# Patient Record
Sex: MALE | Race: WHITE | Employment: UNEMPLOYED | URBAN - METROPOLITAN AREA
[De-identification: names, ages, dates, MRNs, and addresses within clinical notes are randomized per-mention and may not be internally consistent; named-entity substitution may affect disease eponyms.]

---

## 2024-06-21 ENCOUNTER — APPOINTMENT (OUTPATIENT)
Dept: CT IMAGING | Age: 34
End: 2024-06-21

## 2024-06-21 ENCOUNTER — HOSPITAL ENCOUNTER (EMERGENCY)
Age: 34
Discharge: HOME | End: 2024-06-21
Attending: EMERGENCY MEDICINE

## 2024-06-21 ENCOUNTER — APPOINTMENT (OUTPATIENT)
Dept: GENERAL RADIOLOGY | Age: 34
End: 2024-06-21

## 2024-06-21 VITALS
HEART RATE: 99 BPM | WEIGHT: 167.77 LBS | DIASTOLIC BLOOD PRESSURE: 91 MMHG | TEMPERATURE: 97.7 F | RESPIRATION RATE: 18 BRPM | OXYGEN SATURATION: 99 % | SYSTOLIC BLOOD PRESSURE: 141 MMHG

## 2024-06-21 DIAGNOSIS — L03.311 CELLULITIS OF ABDOMINAL WALL: ICD-10-CM

## 2024-06-21 DIAGNOSIS — K59.03 DRUG-INDUCED CONSTIPATION: Primary | ICD-10-CM

## 2024-06-21 DIAGNOSIS — L03.113 CELLULITIS OF RIGHT UPPER EXTREMITY: ICD-10-CM

## 2024-06-21 LAB
ALBUMIN SERPL-MCNC: 4 G/DL (ref 3.5–5.2)
ALBUMIN/GLOB SERPL: 1 {RATIO} (ref 1–2.5)
ALP SERPL-CCNC: 97 U/L (ref 40–129)
ALT SERPL-CCNC: 11 U/L (ref 10–50)
ANION GAP SERPL CALCULATED.3IONS-SCNC: 10 MMOL/L (ref 9–16)
AST SERPL-CCNC: 23 U/L (ref 10–50)
BASOPHILS # BLD: 0.03 K/UL (ref 0–0.2)
BASOPHILS NFR BLD: 0 % (ref 0–2)
BILIRUB SERPL-MCNC: 0.8 MG/DL (ref 0–1.2)
BUN SERPL-MCNC: 12 MG/DL (ref 6–20)
CALCIUM SERPL-MCNC: 8.6 MG/DL (ref 8.6–10.4)
CHLORIDE SERPL-SCNC: 100 MMOL/L (ref 98–107)
CO2 SERPL-SCNC: 27 MMOL/L (ref 20–31)
CREAT SERPL-MCNC: 0.8 MG/DL (ref 0.7–1.2)
EOSINOPHIL # BLD: 0.05 K/UL (ref 0–0.44)
EOSINOPHILS RELATIVE PERCENT: 1 % (ref 1–4)
ERYTHROCYTE [DISTWIDTH] IN BLOOD BY AUTOMATED COUNT: 12.4 % (ref 11.8–14.4)
GFR, ESTIMATED: >90 ML/MIN/1.73M2
GLUCOSE SERPL-MCNC: 106 MG/DL (ref 74–99)
HCT VFR BLD AUTO: 39 % (ref 40.7–50.3)
HGB BLD-MCNC: 12.6 G/DL (ref 13–17)
IMM GRANULOCYTES # BLD AUTO: 0.03 K/UL (ref 0–0.3)
IMM GRANULOCYTES NFR BLD: 0 %
LYMPHOCYTES NFR BLD: 1.53 K/UL (ref 1.1–3.7)
LYMPHOCYTES RELATIVE PERCENT: 17 % (ref 24–43)
MCH RBC QN AUTO: 31 PG (ref 25.2–33.5)
MCHC RBC AUTO-ENTMCNC: 32.3 G/DL (ref 28.4–34.8)
MCV RBC AUTO: 96.1 FL (ref 82.6–102.9)
MONOCYTES NFR BLD: 0.86 K/UL (ref 0.1–1.2)
MONOCYTES NFR BLD: 10 % (ref 3–12)
NEUTROPHILS NFR BLD: 72 % (ref 36–65)
NEUTS SEG NFR BLD: 6.41 K/UL (ref 1.5–8.1)
NRBC BLD-RTO: 0 PER 100 WBC
PLATELET # BLD AUTO: 184 K/UL (ref 138–453)
PMV BLD AUTO: 10.5 FL (ref 8.1–13.5)
POTASSIUM SERPL-SCNC: 3.9 MMOL/L (ref 3.7–5.3)
PROT SERPL-MCNC: 7.4 G/DL (ref 6.6–8.7)
RBC # BLD AUTO: 4.06 M/UL (ref 4.21–5.77)
SODIUM SERPL-SCNC: 137 MMOL/L (ref 136–145)
WBC OTHER # BLD: 8.9 K/UL (ref 3.5–11.3)

## 2024-06-21 PROCEDURE — 6370000000 HC RX 637 (ALT 250 FOR IP): Performed by: STUDENT IN AN ORGANIZED HEALTH CARE EDUCATION/TRAINING PROGRAM

## 2024-06-21 PROCEDURE — 74177 CT ABD & PELVIS W/CONTRAST: CPT

## 2024-06-21 PROCEDURE — 74022 RADEX COMPL AQT ABD SERIES: CPT

## 2024-06-21 PROCEDURE — 96374 THER/PROPH/DIAG INJ IV PUSH: CPT

## 2024-06-21 PROCEDURE — 80053 COMPREHEN METABOLIC PANEL: CPT

## 2024-06-21 PROCEDURE — 99285 EMERGENCY DEPT VISIT HI MDM: CPT

## 2024-06-21 PROCEDURE — 6360000004 HC RX CONTRAST MEDICATION: Performed by: STUDENT IN AN ORGANIZED HEALTH CARE EDUCATION/TRAINING PROGRAM

## 2024-06-21 PROCEDURE — 85025 COMPLETE CBC W/AUTO DIFF WBC: CPT

## 2024-06-21 PROCEDURE — 6360000002 HC RX W HCPCS: Performed by: STUDENT IN AN ORGANIZED HEALTH CARE EDUCATION/TRAINING PROGRAM

## 2024-06-21 RX ORDER — DOXYCYCLINE HYCLATE 100 MG
100 TABLET ORAL ONCE
Status: COMPLETED | OUTPATIENT
Start: 2024-06-21 | End: 2024-06-21

## 2024-06-21 RX ORDER — ACETAMINOPHEN 500 MG
1000 TABLET ORAL ONCE
Status: COMPLETED | OUTPATIENT
Start: 2024-06-21 | End: 2024-06-21

## 2024-06-21 RX ORDER — KETOROLAC TROMETHAMINE 15 MG/ML
15 INJECTION, SOLUTION INTRAMUSCULAR; INTRAVENOUS ONCE
Status: COMPLETED | OUTPATIENT
Start: 2024-06-21 | End: 2024-06-21

## 2024-06-21 RX ORDER — DOCUSATE SODIUM 100 MG/1
100 CAPSULE, LIQUID FILLED ORAL 2 TIMES DAILY
Qty: 60 CAPSULE | Refills: 0 | Status: SHIPPED | OUTPATIENT
Start: 2024-06-21 | End: 2024-07-21

## 2024-06-21 RX ORDER — NICOTINE 21 MG/24HR
1 PATCH, TRANSDERMAL 24 HOURS TRANSDERMAL ONCE
Status: DISCONTINUED | OUTPATIENT
Start: 2024-06-21 | End: 2024-06-22 | Stop reason: HOSPADM

## 2024-06-21 RX ORDER — POLYETHYLENE GLYCOL 3350 17 G/17G
17 POWDER, FOR SOLUTION ORAL DAILY PRN
Qty: 527 G | Refills: 1 | Status: SHIPPED | OUTPATIENT
Start: 2024-06-21 | End: 2024-07-21

## 2024-06-21 RX ORDER — DOXYCYCLINE HYCLATE 100 MG
100 TABLET ORAL 2 TIMES DAILY
Qty: 14 TABLET | Refills: 0 | Status: SHIPPED | OUTPATIENT
Start: 2024-06-21 | End: 2024-06-28

## 2024-06-21 RX ADMIN — DOXYCYCLINE HYCLATE 100 MG: 100 TABLET, COATED ORAL at 18:11

## 2024-06-21 RX ADMIN — ACETAMINOPHEN 1000 MG: 500 TABLET ORAL at 21:53

## 2024-06-21 RX ADMIN — KETOROLAC TROMETHAMINE 15 MG: 15 INJECTION, SOLUTION INTRAMUSCULAR; INTRAVENOUS at 21:54

## 2024-06-21 RX ADMIN — GLYCERIN 2 G: 2 SUPPOSITORY RECTAL at 18:48

## 2024-06-21 RX ADMIN — MAGNESIUM HYDROXIDE 30 ML: 400 SUSPENSION ORAL at 18:11

## 2024-06-21 RX ADMIN — IOPAMIDOL 75 ML: 755 INJECTION, SOLUTION INTRAVENOUS at 21:40

## 2024-06-21 ASSESSMENT — ENCOUNTER SYMPTOMS
SHORTNESS OF BREATH: 0
CONSTIPATION: 1
ABDOMINAL PAIN: 1

## 2024-06-21 ASSESSMENT — PAIN DESCRIPTION - LOCATION: LOCATION: ABDOMEN

## 2024-06-21 ASSESSMENT — PAIN - FUNCTIONAL ASSESSMENT: PAIN_FUNCTIONAL_ASSESSMENT: 0-10

## 2024-06-21 ASSESSMENT — PAIN SCALES - GENERAL: PAINLEVEL_OUTOF10: 7

## 2024-06-21 NOTE — ED NOTES
Pt came into the ed via triage due to having constipation   Pt stated he is also worried about a scab and wound on his stomach   Pt stated he is a drug user and has not done IV drugs for a couple days   Pt is Aox4 and ambulatory   RR are equal and regular   Pt has no other C/O at this time

## 2024-06-21 NOTE — ED PROVIDER NOTES
Exam  Constitutional:       General: He is not in acute distress.     Appearance: He is not ill-appearing.   HENT:      Mouth/Throat:      Mouth: Mucous membranes are moist.   Eyes:      Extraocular Movements: Extraocular movements intact.      Pupils: Pupils are equal, round, and reactive to light.   Pulmonary:      Effort: Pulmonary effort is normal.   Abdominal:      Palpations: Abdomen is soft.      Tenderness: There is generalized abdominal tenderness. There is no guarding.   Skin:     Comments: Patient with scabbed lesion on the lower abdomen with surrounding erythema.  Slight tenderness associated.  No induration or fluctuance at this site.  Similar lesion on the volar surface of the right forearm without sign of abscess.   Neurological:      Mental Status: He is alert.       DDX/DIAGNOSTIC RESULTS / EMERGENCY DEPARTMENT COURSE / MDM     Medical Decision Making  33-year-old male presents emergency department for evaluation of abdominal cramping and constipation since having quit narcotic use and starting Suboxone.  Some small amount of bright red blood when straining but otherwise not large-volume.  Discussed performing digital rectal exam to assess for stool ball/stool impaction.  Patient would prefer to try medications prior to digital rectal exam.  Will give milk of magnesia as well as glycerin suppository to assist in bowel movement.  Patient will likely need to be started on bowel regimen such as MiraLAX, Colace etc.  Patient also has 2 areas of cellulitis with scab lesions 1 on abdomen and 1 on the right forearm.  Will start on doxycycline given history of IV drug use.  Did discuss with patient staying out of the sun while on this medication as it can lead to severe burns.  Patient understanding and states that he will be able to do this.    Amount and/or Complexity of Data Reviewed  Labs: ordered.  Radiology: ordered.    Risk  OTC drugs.  Prescription drug management.        EKG  None    All EKG's are  MAGNESIA) 400 MG/5ML suspension Take 30 mLs by mouth daily as needed for Constipation, Disp-118 mL, R-0Print      polyethylene glycol (MIRALAX) 17 g packet Take 1 packet by mouth daily as needed for Constipation, Disp-527 g, R-1Print             Home Dasilva MD  Emergency Medicine Resident    (Please note that portions of thisnote were completed with a voice recognition program.  Efforts were made to edit the dictations but occasionally words are mis-transcribed.)

## 2024-06-21 NOTE — DISCHARGE INSTRUCTIONS
Please take the antibiotic as prescribed until the course is complete.  Complete the entire course of the medication to prevent reinfection/resistant infection.  The medication doxycycline can cause severe burns if you are exposed to sunlight for long durations.  Please avoid staying outside while you are on this medication especially given your light skin tone.    Your CT scan did not show any evidence of stool in the stomach.  It did show heavy stool burden however.  Please take the medication you were given medication to help assist in bowel movement.  It is recommended that you start taking MiraLAX and or Metamucil to help with your constipation.  You can also take the Colace daily to help with your constipation symptoms as well.    PLEASE RETURN TO THE EMERGENCY DEPARTMENT IMMEDIATELY for worsening symptoms, white drainage from the wound, redness or streaking, or if you develop any concerning symptoms such as: high fever not relieved by acetaminophen (Tylenol) and/or ibuprofen (Motrin / Advil), chills, shortness of breath, chest pain, feeling of your heart fluttering or racing, persistent nausea and/or vomiting, vomiting up blood, blood in your stool, loss of consciousness, numbness, weakness or tingling in the arms or legs or change in color of the extremities, changes in mental status, persistent headache, blurry vision, loss of bladder / bowel control, unable to follow up with your physician, or other any other care or concern.

## 2024-06-22 NOTE — ED PROVIDER NOTES
[facility]     Emergency Department     Faculty Attestation  8:04 PM EDT      I performed a history and physical examination of the patient and discussed management with the resident. I have reviewed and agree with the resident’s findings including all diagnostic interpretations, and treatment plans as written. Any areas of disagreement are noted on the chart. I was personally present for the key portions of any procedures. I have documented in the chart those procedures where I was not present during the key portions. I have reviewed the emergency nurses triage note. I agree with the chief complaint, past medical history, past surgical history, allergies, medications, social and family history as documented unless otherwise noted below. Documentation of the HPI, Physical Exam and Medical Decision Making performed by scriblouisa is based on my personal performance of the HPI, PE and MDM. For Physician Assistant/ Nurse Practitioner cases/documentation I have personally evaluated this patient and have completed at least one if not all key elements of the E/M (history, physical exam, and MDM). Additional findings are as noted.    Patient with constipation for the past 7 days unable to have a bowel movement.  Is unable to pass stool or gas.  Did tolerate oral intake while in the emergency room.  But does also having abdominal pain.    Patient with a diffusely tender abdomen.  Rectal exam was performed by resident with me present in the room.  There is a stool ball palpated but patient did not tolerate rectal exam very well.  Will plan on abdominal series, to rule out any mechanical reasons and plan on enema.      Maryanne Stone D.O, M.P.H  Attending Emergency Medicine Physician         Maryanne Stone,   06/21/24 2005

## 2024-08-06 ENCOUNTER — HOSPITAL ENCOUNTER (EMERGENCY)
Age: 34
Discharge: ANOTHER HOSPITAL | DRG: 751 | End: 2024-08-06
Attending: EMERGENCY MEDICINE
Payer: MEDICAID

## 2024-08-06 ENCOUNTER — HOSPITAL ENCOUNTER (INPATIENT)
Age: 34
LOS: 6 days | Discharge: HOME | DRG: 751 | End: 2024-08-12
Attending: PSYCHIATRY & NEUROLOGY | Admitting: PSYCHIATRY & NEUROLOGY
Payer: MEDICAID

## 2024-08-06 VITALS
RESPIRATION RATE: 16 BRPM | HEART RATE: 61 BPM | BODY MASS INDEX: 24.25 KG/M2 | DIASTOLIC BLOOD PRESSURE: 68 MMHG | HEIGHT: 68 IN | SYSTOLIC BLOOD PRESSURE: 103 MMHG | WEIGHT: 160 LBS | OXYGEN SATURATION: 99 % | TEMPERATURE: 99 F

## 2024-08-06 DIAGNOSIS — R45.851 SUICIDAL IDEATION: Primary | ICD-10-CM

## 2024-08-06 PROBLEM — F32.A DEPRESSION WITH SUICIDAL IDEATION: Status: ACTIVE | Noted: 2024-08-06

## 2024-08-06 PROBLEM — F33.3 MAJOR DEPRESSIVE DISORDER, RECURRENT, SEVERE WITH PSYCHOTIC FEATURES (HCC): Status: ACTIVE | Noted: 2024-08-06

## 2024-08-06 LAB
ALBUMIN SERPL-MCNC: 4.3 G/DL (ref 3.5–5.2)
ALBUMIN/GLOB SERPL: 1 {RATIO} (ref 1–2.5)
ALP SERPL-CCNC: 113 U/L (ref 40–129)
ALT SERPL-CCNC: 14 U/L (ref 10–50)
AMPHET UR QL SCN: POSITIVE
ANION GAP SERPL CALCULATED.3IONS-SCNC: 11 MMOL/L (ref 9–16)
APAP SERPL-MCNC: <5 UG/ML (ref 10–30)
AST SERPL-CCNC: 22 U/L (ref 10–50)
BARBITURATES UR QL SCN: NEGATIVE
BASOPHILS # BLD: 0.04 K/UL (ref 0–0.2)
BASOPHILS NFR BLD: 1 % (ref 0–2)
BENZODIAZ UR QL: NEGATIVE
BILIRUB SERPL-MCNC: 0.8 MG/DL (ref 0–1.2)
BUN SERPL-MCNC: 20 MG/DL (ref 6–20)
CALCIUM SERPL-MCNC: 8.8 MG/DL (ref 8.6–10.4)
CANNABINOIDS UR QL SCN: POSITIVE
CHLORIDE SERPL-SCNC: 102 MMOL/L (ref 98–107)
CO2 SERPL-SCNC: 23 MMOL/L (ref 20–31)
COCAINE UR QL SCN: NEGATIVE
CREAT SERPL-MCNC: 0.9 MG/DL (ref 0.7–1.2)
EOSINOPHIL # BLD: 0.05 K/UL (ref 0–0.44)
EOSINOPHILS RELATIVE PERCENT: 1 % (ref 1–4)
ERYTHROCYTE [DISTWIDTH] IN BLOOD BY AUTOMATED COUNT: 13.4 % (ref 11.8–14.4)
ETHANOL PERCENT: <0.01 %
ETHANOLAMINE SERPL-MCNC: <10 MG/DL (ref 0–0.08)
FENTANYL UR QL: NEGATIVE
GFR, ESTIMATED: >90 ML/MIN/1.73M2
GLUCOSE SERPL-MCNC: 112 MG/DL (ref 74–99)
HCT VFR BLD AUTO: 57.1 % (ref 40.7–50.3)
HGB BLD-MCNC: 18.9 G/DL (ref 13–17)
IMM GRANULOCYTES # BLD AUTO: <0.03 K/UL (ref 0–0.3)
IMM GRANULOCYTES NFR BLD: 0 %
INR PPP: 1.1
LACTIC ACID, WHOLE BLOOD: 1.3 MMOL/L (ref 0.7–2.1)
LIPASE SERPL-CCNC: 14 U/L (ref 13–60)
LYMPHOCYTES NFR BLD: 1.34 K/UL (ref 1.1–3.7)
LYMPHOCYTES RELATIVE PERCENT: 25 % (ref 24–43)
MCH RBC QN AUTO: 30.6 PG (ref 25.2–33.5)
MCHC RBC AUTO-ENTMCNC: 33.1 G/DL (ref 28.4–34.8)
MCV RBC AUTO: 92.4 FL (ref 82.6–102.9)
METHADONE UR QL: NEGATIVE
MONOCYTES NFR BLD: 0.45 K/UL (ref 0.1–1.2)
MONOCYTES NFR BLD: 8 % (ref 3–12)
NEUTROPHILS NFR BLD: 65 % (ref 36–65)
NEUTS SEG NFR BLD: 3.51 K/UL (ref 1.5–8.1)
NRBC BLD-RTO: 0 PER 100 WBC
OPIATES UR QL SCN: NEGATIVE
OXYCODONE UR QL SCN: NEGATIVE
PCP UR QL SCN: NEGATIVE
PLATELET # BLD AUTO: ABNORMAL K/UL (ref 138–453)
PLATELET, FLUORESCENCE: 249 K/UL (ref 138–453)
PLATELETS.RETICULATED NFR BLD AUTO: 3 % (ref 1.1–10.3)
POTASSIUM SERPL-SCNC: 3.9 MMOL/L (ref 3.7–5.3)
PROT SERPL-MCNC: 7.4 G/DL (ref 6.6–8.7)
PROTHROMBIN TIME: 14 SEC (ref 11.7–14.9)
RBC # BLD AUTO: 6.18 M/UL (ref 4.21–5.77)
SALICYLATES SERPL-MCNC: <0.5 MG/DL (ref 0–10)
SODIUM SERPL-SCNC: 136 MMOL/L (ref 136–145)
TEST INFORMATION: ABNORMAL
WBC OTHER # BLD: 5.4 K/UL (ref 3.5–11.3)

## 2024-08-06 PROCEDURE — 6370000000 HC RX 637 (ALT 250 FOR IP): Performed by: NURSE PRACTITIONER

## 2024-08-06 PROCEDURE — 6360000002 HC RX W HCPCS

## 2024-08-06 PROCEDURE — 83690 ASSAY OF LIPASE: CPT

## 2024-08-06 PROCEDURE — 99222 1ST HOSP IP/OBS MODERATE 55: CPT | Performed by: INTERNAL MEDICINE

## 2024-08-06 PROCEDURE — 80143 DRUG ASSAY ACETAMINOPHEN: CPT

## 2024-08-06 PROCEDURE — 85025 COMPLETE CBC W/AUTO DIFF WBC: CPT

## 2024-08-06 PROCEDURE — APPSS60 APP SPLIT SHARED TIME 46-60 MINUTES

## 2024-08-06 PROCEDURE — 83605 ASSAY OF LACTIC ACID: CPT

## 2024-08-06 PROCEDURE — 80307 DRUG TEST PRSMV CHEM ANLYZR: CPT

## 2024-08-06 PROCEDURE — 80179 DRUG ASSAY SALICYLATE: CPT

## 2024-08-06 PROCEDURE — 6370000000 HC RX 637 (ALT 250 FOR IP)

## 2024-08-06 PROCEDURE — 85610 PROTHROMBIN TIME: CPT

## 2024-08-06 PROCEDURE — 99223 1ST HOSP IP/OBS HIGH 75: CPT | Performed by: PSYCHIATRY & NEUROLOGY

## 2024-08-06 PROCEDURE — 1240000000 HC EMOTIONAL WELLNESS R&B

## 2024-08-06 PROCEDURE — 99285 EMERGENCY DEPT VISIT HI MDM: CPT

## 2024-08-06 PROCEDURE — 85055 RETICULATED PLATELET ASSAY: CPT

## 2024-08-06 PROCEDURE — G0480 DRUG TEST DEF 1-7 CLASSES: HCPCS

## 2024-08-06 PROCEDURE — 80053 COMPREHEN METABOLIC PANEL: CPT

## 2024-08-06 RX ORDER — ACETAMINOPHEN 325 MG/1
650 TABLET ORAL EVERY 4 HOURS PRN
Status: DISCONTINUED | OUTPATIENT
Start: 2024-08-06 | End: 2024-08-12 | Stop reason: HOSPADM

## 2024-08-06 RX ORDER — HALOPERIDOL 5 MG/1
5 TABLET ORAL EVERY 6 HOURS PRN
Status: DISCONTINUED | OUTPATIENT
Start: 2024-08-06 | End: 2024-08-12 | Stop reason: HOSPADM

## 2024-08-06 RX ORDER — LORAZEPAM 2 MG/ML
INJECTION INTRAMUSCULAR
Status: COMPLETED
Start: 2024-08-06 | End: 2024-08-06

## 2024-08-06 RX ORDER — PALIPERIDONE 3 MG/1
3 TABLET, EXTENDED RELEASE ORAL DAILY
Status: DISCONTINUED | OUTPATIENT
Start: 2024-08-06 | End: 2024-08-12 | Stop reason: HOSPADM

## 2024-08-06 RX ORDER — DIPHENHYDRAMINE HYDROCHLORIDE 50 MG/ML
50 INJECTION INTRAMUSCULAR; INTRAVENOUS EVERY 6 HOURS PRN
Status: DISCONTINUED | OUTPATIENT
Start: 2024-08-06 | End: 2024-08-12 | Stop reason: HOSPADM

## 2024-08-06 RX ORDER — CYCLOBENZAPRINE HCL 10 MG
10 TABLET ORAL 3 TIMES DAILY PRN
Status: DISCONTINUED | OUTPATIENT
Start: 2024-08-06 | End: 2024-08-12 | Stop reason: HOSPADM

## 2024-08-06 RX ORDER — MAGNESIUM HYDROXIDE/ALUMINUM HYDROXICE/SIMETHICONE 120; 1200; 1200 MG/30ML; MG/30ML; MG/30ML
30 SUSPENSION ORAL EVERY 6 HOURS PRN
Status: DISCONTINUED | OUTPATIENT
Start: 2024-08-06 | End: 2024-08-12 | Stop reason: HOSPADM

## 2024-08-06 RX ORDER — POLYETHYLENE GLYCOL 3350 17 G/17G
17 POWDER, FOR SOLUTION ORAL DAILY PRN
Status: DISCONTINUED | OUTPATIENT
Start: 2024-08-06 | End: 2024-08-12 | Stop reason: HOSPADM

## 2024-08-06 RX ORDER — LORAZEPAM 2 MG/ML
2 INJECTION INTRAMUSCULAR EVERY 6 HOURS PRN
Status: DISCONTINUED | OUTPATIENT
Start: 2024-08-06 | End: 2024-08-06

## 2024-08-06 RX ORDER — BUPRENORPHINE AND NALOXONE 2; .5 MG/1; MG/1
2 FILM, SOLUBLE BUCCAL; SUBLINGUAL PRN
Status: DISCONTINUED | OUTPATIENT
Start: 2024-08-06 | End: 2024-08-12 | Stop reason: HOSPADM

## 2024-08-06 RX ORDER — DIPHENHYDRAMINE HYDROCHLORIDE 50 MG/ML
INJECTION INTRAMUSCULAR; INTRAVENOUS
Status: COMPLETED
Start: 2024-08-06 | End: 2024-08-06

## 2024-08-06 RX ORDER — HALOPERIDOL 5 MG/ML
5 INJECTION INTRAMUSCULAR EVERY 6 HOURS PRN
Status: DISCONTINUED | OUTPATIENT
Start: 2024-08-06 | End: 2024-08-12 | Stop reason: HOSPADM

## 2024-08-06 RX ORDER — DICYCLOMINE HCL 20 MG
20 TABLET ORAL 4 TIMES DAILY PRN
Status: DISCONTINUED | OUTPATIENT
Start: 2024-08-06 | End: 2024-08-12 | Stop reason: HOSPADM

## 2024-08-06 RX ORDER — HALOPERIDOL 5 MG/ML
INJECTION INTRAMUSCULAR
Status: COMPLETED
Start: 2024-08-06 | End: 2024-08-06

## 2024-08-06 RX ORDER — TRAZODONE HYDROCHLORIDE 50 MG/1
50 TABLET, FILM COATED ORAL NIGHTLY PRN
Status: DISCONTINUED | OUTPATIENT
Start: 2024-08-06 | End: 2024-08-12 | Stop reason: HOSPADM

## 2024-08-06 RX ORDER — CLONIDINE HYDROCHLORIDE 0.1 MG/1
0.1 TABLET ORAL EVERY 4 HOURS PRN
Status: DISCONTINUED | OUTPATIENT
Start: 2024-08-06 | End: 2024-08-12 | Stop reason: HOSPADM

## 2024-08-06 RX ORDER — ONDANSETRON 4 MG/1
4 TABLET, FILM COATED ORAL 3 TIMES DAILY PRN
Status: DISCONTINUED | OUTPATIENT
Start: 2024-08-06 | End: 2024-08-12 | Stop reason: HOSPADM

## 2024-08-06 RX ORDER — HYDROXYZINE HYDROCHLORIDE 50 MG/1
50 TABLET, FILM COATED ORAL 3 TIMES DAILY PRN
Status: DISCONTINUED | OUTPATIENT
Start: 2024-08-06 | End: 2024-08-12 | Stop reason: HOSPADM

## 2024-08-06 RX ORDER — LORAZEPAM 1 MG/1
2 TABLET ORAL EVERY 6 HOURS PRN
Status: DISCONTINUED | OUTPATIENT
Start: 2024-08-06 | End: 2024-08-06

## 2024-08-06 RX ADMIN — DIPHENHYDRAMINE HYDROCHLORIDE 50 MG: 50 INJECTION INTRAMUSCULAR; INTRAVENOUS at 17:45

## 2024-08-06 RX ADMIN — CYCLOBENZAPRINE 10 MG: 10 TABLET, FILM COATED ORAL at 12:28

## 2024-08-06 RX ADMIN — CLONIDINE HYDROCHLORIDE 0.1 MG: 0.1 TABLET ORAL at 16:33

## 2024-08-06 RX ADMIN — HYDROXYZINE HYDROCHLORIDE 50 MG: 50 TABLET, FILM COATED ORAL at 12:28

## 2024-08-06 RX ADMIN — HALOPERIDOL LACTATE 5 MG: 5 INJECTION, SOLUTION INTRAMUSCULAR at 17:43

## 2024-08-06 RX ADMIN — BUPRENORPHINE AND NALOXONE 2 FILM: 2; .5 FILM BUCCAL; SUBLINGUAL at 12:28

## 2024-08-06 RX ADMIN — LORAZEPAM 2 MG: 2 INJECTION INTRAMUSCULAR; INTRAVENOUS at 17:45

## 2024-08-06 ASSESSMENT — PATIENT HEALTH QUESTIONNAIRE - PHQ9
5. POOR APPETITE OR OVEREATING: MORE THAN HALF THE DAYS
SUM OF ALL RESPONSES TO PHQ QUESTIONS 1-9: 20
SUM OF ALL RESPONSES TO PHQ QUESTIONS 1-9: 20
9. THOUGHTS THAT YOU WOULD BE BETTER OFF DEAD, OR OF HURTING YOURSELF: MORE THAN HALF THE DAYS
SUM OF ALL RESPONSES TO PHQ QUESTIONS 1-9: 18
4. FEELING TIRED OR HAVING LITTLE ENERGY: MORE THAN HALF THE DAYS
3. TROUBLE FALLING OR STAYING ASLEEP: MORE THAN HALF THE DAYS
8. MOVING OR SPEAKING SO SLOWLY THAT OTHER PEOPLE COULD HAVE NOTICED. OR THE OPPOSITE, BEING SO FIGETY OR RESTLESS THAT YOU HAVE BEEN MOVING AROUND A LOT MORE THAN USUAL: MORE THAN HALF THE DAYS
7. TROUBLE CONCENTRATING ON THINGS, SUCH AS READING THE NEWSPAPER OR WATCHING TELEVISION: MORE THAN HALF THE DAYS
2. FEELING DOWN, DEPRESSED OR HOPELESS: NEARLY EVERY DAY
SUM OF ALL RESPONSES TO PHQ9 QUESTIONS 1 & 2: 6
10. IF YOU CHECKED OFF ANY PROBLEMS, HOW DIFFICULT HAVE THESE PROBLEMS MADE IT FOR YOU TO DO YOUR WORK, TAKE CARE OF THINGS AT HOME, OR GET ALONG WITH OTHER PEOPLE: VERY DIFFICULT
6. FEELING BAD ABOUT YOURSELF - OR THAT YOU ARE A FAILURE OR HAVE LET YOURSELF OR YOUR FAMILY DOWN: MORE THAN HALF THE DAYS
1. LITTLE INTEREST OR PLEASURE IN DOING THINGS: NEARLY EVERY DAY
SUM OF ALL RESPONSES TO PHQ QUESTIONS 1-9: 20

## 2024-08-06 ASSESSMENT — SLEEP AND FATIGUE QUESTIONNAIRES
SLEEP PATTERN: DIFFICULTY FALLING ASLEEP;DISTURBED/INTERRUPTED SLEEP;RESTLESSNESS
DO YOU USE A SLEEP AID: NO
DO YOU HAVE DIFFICULTY SLEEPING: YES
AVERAGE NUMBER OF SLEEP HOURS: 4

## 2024-08-06 ASSESSMENT — PAIN - FUNCTIONAL ASSESSMENT
PAIN_FUNCTIONAL_ASSESSMENT: NONE - DENIES PAIN

## 2024-08-06 ASSESSMENT — PAIN DESCRIPTION - FREQUENCY: FREQUENCY: CONTINUOUS

## 2024-08-06 ASSESSMENT — LIFESTYLE VARIABLES
HOW MANY STANDARD DRINKS CONTAINING ALCOHOL DO YOU HAVE ON A TYPICAL DAY: 3 OR 4
HOW OFTEN DO YOU HAVE A DRINK CONTAINING ALCOHOL: 2-4 TIMES A MONTH
HOW MANY STANDARD DRINKS CONTAINING ALCOHOL DO YOU HAVE ON A TYPICAL DAY: 1 OR 2
HOW OFTEN DO YOU HAVE A DRINK CONTAINING ALCOHOL: 2-4 TIMES A MONTH

## 2024-08-06 ASSESSMENT — PAIN DESCRIPTION - DESCRIPTORS: DESCRIPTORS: ACHING;SORE

## 2024-08-06 ASSESSMENT — PAIN DESCRIPTION - ORIENTATION: ORIENTATION: RIGHT;LEFT

## 2024-08-06 ASSESSMENT — PAIN SCALES - GENERAL
PAINLEVEL_OUTOF10: 4
PAINLEVEL_OUTOF10: 7

## 2024-08-06 ASSESSMENT — PAIN DESCRIPTION - LOCATION: LOCATION: FOOT

## 2024-08-06 NOTE — ED PROVIDER NOTES
[facility] ED  Emergency Department Encounter  Emergency Medicine Resident     Pt Name:Norbert Bravo  MRN: [id number]  Birthdate 1990  Date of evaluation: 8/6/24  PCP:  No primary care provider on file.  Note Started: 3:24 AM EDT      CHIEF COMPLAINT       Chief Complaint   Patient presents with    Suicidal       HISTORY OF PRESENT ILLNESS  (Location/Symptom, Timing/Onset, Context/Setting, Quality, Duration, Modifying Factors, Severity.)      Norbert Bravo is a 33 y.o. male past medical history of polysubstance abuse, depression, seizures, presenting for assessment due to suicidal ideations.  Patient states that his suicidality started approximately 3 months ago.  Suicidality it was triggered by trouble with law enforcement and breakdown of some interpersonal relationships.  He denies having a plan.  He was contemplating jumping off of a bridge earlier today.  He also reports that he drank some cleaning solution.  He is not sure what it was.  He reports this was earlier in the day.  He does report previous suicide attempts.  Most recent documentation demonstrating admission in 2014 where he was discharged with citalopram and trazodone.  States that he is not currently on any psychiatric meds.      PAST MEDICAL / SURGICAL / SOCIAL / FAMILY HISTORY      has a past medical history of Depression and Suicidal thoughts.       has no past surgical history on file.      Social History     Socioeconomic History    Marital status: Single     Spouse name: Not on file    Number of children: Not on file    Years of education: Not on file    Highest education level: Not on file   Occupational History    Not on file   Tobacco Use    Smoking status: Every Day     Types: Cigarettes    Smokeless tobacco: Not on file   Substance and Sexual Activity    Alcohol use: Yes     Alcohol/week: 4.2 standard drinks of alcohol     Types: 5 drink(s) per week     Comment: occasional    Drug use: Yes     Types: IV,

## 2024-08-06 NOTE — CARE COORDINATION
[facility] Biopsychosocial Assessment    Current Level of Psychosocial Functioning     Independent xx  Dependent    Minimal Assist     Comments:    Psychosocial High Risk Factors (check all that apply)    Unable to obtain meds   Chronic illness/pain  xx  Substance abuse xx  Lack of Family Support xx  Financial stress xx  Isolation   Inadequate Community Resources xx  Suicide attempt(s)  Not taking medications   Victim of crime   Developmental Delay  Unable to manage personal needs    Age 65 or older   Homeless  No transportation   Readmission within 30 days  Unemployment  Traumatic Event    Comments:   Psychiatric Advanced Directives: none reported     Family to Involve in Treatment: no family support     Sexual Orientation:  n/a    Patient Strengths: exploring AOD recovery treatment     Patient Barriers: homeless, no income, not linked with an outpatient provider       Opiate Education Provided:  history of opiate abuse, reports purchasing suboxone from the street       CMHC/mental health history: is not linked with provider     Plan of Care   medication management, group/individual therapies, family meetings, psycho -education, treatment team meetings to assist with stabilization    Initial Discharge Plan:  going to A[facility] recovery treatment at discharge       Clinical Summary:  Norbert is a 33 year old single male who has been admitted to [facility] with report of suicidal ideation with aborted attempt of jumping out window. He states he has been living with a friend but cannot return there at discharge. He reports interest in receiving treatment for polysubstance use but states he was not accepted to programs due to an open warrant; reports the warrant is for violation of misdemeanor probation and was addressed prior to his hospital admission. Writer provided A[facility] recovery folder for his use. Ongoing support, encouragement provided.

## 2024-08-06 NOTE — ED NOTES
Provisional Diagnosis:  Depression with SI, Substance abuse      Psychosocial and Contextual Factors:     Pt reports was living with a friend but it did not work out so currently homeless.     C-SSRS Summary:    Patient: X   Family:  Agency:    Present Suicidal Behavior:    Verbal: Yes    Attempt: Yes, self-interrupted     Past Suicidal Behavior:    Verbal: Yes    Attempt: Yes    Self-Injurious/Self-Mutilation: Not discussed       Protective Factors:  Willing to go inpatient       Risk Factors:  Not  currently receiving treatment for MH, Substance Abuse       Clinical Summary:  Pt reports to ED reporting SI with plan to jump off a bridge. Pt reports he was at his home and was ready to jump out of the window and almost did but stopped himself. Pt reported a bottle of an unknown cleaning solution for counter tops was next to him so he began to drink an unknown amount. Pt reports he is not receiving treatment for mental health at this time but has prior diagnosis for depressive disorder from 2013. Pt reports previous suicide attempts with last attempt being a few years ago. Per charting pt's last admission into an inpatient  facility was approximately ten years ago. Pt denies hallucinations and HI stating, \"I have never been violent with anyone unless it was to protect myself\". Pt was observed with many different emotions while in ED from tearfulness, to mildly restless, and lastly sleepy. Pt has been cooperative and polite to others while in the ED.     Pt has hx of polysubstance abuse, most recently heroin. Pt reports he currently is taking Suboxone that is not prescribed to him and that he buys it on the streets. Pt reports he supports his habit by \"Doing about anything\". Pt was positive today for amphetamines and marijuana. Pt stated he drank 1-2 tallboys tonight. Ethanol was negative.     Pt reports he was in [facility] approximately one year ago and is currently on probation in [facility]. Pt reports no social  support he has no one because they have all passed away and is now homeless due to confrontation with a friend.     Level of Care Disposition:     Pt will be presented to on call psychiatrist for psychiatric disposition.

## 2024-08-06 NOTE — ED NOTES
Jennifer ALBERT at bedside to assess patient. Pt is placed on monitoring equipment via portable monitoring system.

## 2024-08-06 NOTE — PLAN OF CARE
[facility]  Initial Interdisciplinary Treatment Plan NO      Original treatment plan Date & Time: 8/6/24 1245    Admission Type:  Admission Type: Voluntary    Reason for admission:   Reason for Admission: Patient voluntary from [facility]. Presented with suicidal thoughts to jump out of a window or off of high level bridge due to increased drug use and being kicked out of his friend's house. Patient also reports drinking a small amount of cleaning solution before walking to the high level bridge. Patient reports feeling hopeless and helpess due to his housing and financial situation. Patient reports struggling with physical health after an overdose he had a few months ago. Patient reports having to take care of his parents as a child and increasing depression since they passed, stating he has no family left. Patient also reports losing his only friend as he was the one that called the  on him, resulting in his suicidal ideations.    Estimated Length of Stay:  5-7days  Estimated Discharge Date: to be determined by physician    PATIENT STRENGTHS:  Patient Strengths:   Patient Strengths and Limitations:Limitations: Inappropriate/potentially harmful leisure interests, Difficulty problem solving/relies on others to help solve problems, Difficult relationships / poor social skills  Addictive Behavior: Addictive Behavior  In the Past 3 Months, Have You Felt or Has Someone Told You That You Have a Problem With  : None  Medical Problems:  Past Medical History:   Diagnosis Date    Depression     Suicidal thoughts      Status EXAM:Mental Status and Behavioral Exam  Normal: No  Level of Assistance: Independent/Self  Facial Expression: Sad, Worried  Affect: Appropriate  Level of Consciousness: Alert  Frequency of Checks: 4 times per hour, close  Mood:Normal: No  Mood: Depressed, Anxious, Helpless, Sad  Motor Activity:Normal: No  Motor Activity: Increased, Unusual posture/gait  Eye Contact: Fair  Observed

## 2024-08-06 NOTE — BH NOTE
Patient given tobacco quitline number 15200637871 at this time, refusing to call at this time, states \" I just dont want to quit now\"- patient given information as to the dangers of long term tobacco use. Continue to reinforce the importance of tobacco cessation.

## 2024-08-06 NOTE — BH NOTE
[facility]  Admission Note     Admission Type:   Voluntary     Reason for admission:  Reason for Admission: Patient voluntary from [facility]. Presented with suicidal thoughts to jump out of a window or off of high level bridge due to increased drug use and being kicked out of his friend's house. Patient also reports drinking a small amount of cleaning solution before walking to the high level bridge. Patient reports feeling hopeless and helpess due to his housing and financial situation. Patient reports struggling with physical health after an overdose he had a few months ago. Patient reports having to take care of his parents as a child and increasing depression since they passed, stating he has no family left. Patient also reports losing his only friend as he was the one that called the  on him, resulting in his suicidal ideations.      Addictive Behavior:   Addictive Behavior  In the Past 3 Months, Have You Felt or Has Someone Told You That You Have a Problem With  : None    Medical Problems:   Past Medical History:   Diagnosis Date    Depression     Suicidal thoughts        Status EXAM:  Mental Status and Behavioral Exam  Normal: No  Level of Assistance: Independent/Self  Facial Expression: Sad, Worried  Affect: Appropriate  Level of Consciousness: Alert  Frequency of Checks: 4 times per hour, close  Mood:Normal: No  Mood: Depressed, Anxious, Helpless, Sad  Motor Activity:Normal: No  Motor Activity: Increased, Unusual posture/gait  Eye Contact: Fair  Observed Behavior: Cooperative, Friendly, Preoccupied  Sexual Misconduct History: Current - no  Preception: [place] to person, [place] to time, [place] to place, [place] to situation  Attention:Normal: No  Attention: Unable to concentrate, Distractible  Thought Processes: Tangential, Circumstantial  Thought Content:Normal: No  Thought Content: Preoccupations, Poverty of content  Depression Symptoms: Crying, Feelings of helplessness, Feelings of  hopelessess, Impaired concentration  Anxiety Symptoms: Generalized, Panic attack  Noemi Symptoms: No problems reported or observed.  Hallucinations: None  Delusions: No  Memory:Normal: No  Memory: Poor recent, Poor remote  Insight and Judgment: No  Insight and Judgment: Poor judgment, Poor insight    Tobacco Screening:  Practical Counseling, on admission, nghia X, if applicable and completed (first 3 are required if patient doesn't refuse):            ( ) Recognizing danger situations (included triggers and roadblocks)                    ( ) Coping skills (new ways to manage stress,relaxation techniques, changing routine, distraction)                                                           ( ) Basic information about quitting (benefits of quitting, techniques in how to quit, available resources  ( ) [facility]                                                                                                                   (x) Patient refused counseling  ( ) Patient has not smoked in the last 30 days    Metabolic Screening:    No results found for: \"LABA1C\"    No results found for: \"CHOL\"  No results found for: \"TRIG\"  No results found for: \"HDL\"  No components found for: \"LDLCAL\"  No components found for: \"LABVLDL\"      Body mass index is 22.26 kg/m².    BP Readings from Last 2 Encounters:   08/06/24 115/81   08/06/24 103/68       Recliner Assessment:  Patient is able to demonstrate the ability to move from a reclining position to an upright position within the recliner.    Pt admitted with followings belongings:  Dental Appliances: None  Vision - Corrective Lenses: None  Hearing Aid: None  Jewelry: None  Body Piercings Removed: N/A  Clothing: Belt, Footwear, Shirt, Shorts, Socks, Undergarments  Other Valuables: Lighter/Matches, Other (Comment) (ID)  The following personal items were collected during admission. Items secured in locker/safe. Items will be returned to

## 2024-08-06 NOTE — PROGRESS NOTES
Pharmacy Medication History Note      List of current medications patient is taking is complete.    Source of information: Epic, PDMP    Changes made to medication list:  Medications removed (include reason, ex. therapy complete or physician discontinued, noncompliance):  Citalopram and Trazodone (list clean up - prescriptions from 2024)    Medications flagged for provider review:  none    Medications added/doses adjusted:  none    Other notes (ex. Recent course of antibiotics, Coumadin dosing):  Patient had milk of magnesia prescribed on 6/21/24, unclear if he ever filled prescription.       Current Home Medication List at Time of Admission:  Prior to Admission medications    Medication Sig   magnesium hydroxide (MILK OF MAGNESIA) 400 MG/5ML suspension Take 30 mLs by mouth daily as needed for Constipation  Patient not taking: Reported on 8/6/2024         Please let me know if you have any questions about this encounter. Thank you!    Electronically signed by Ester Buitrago RPH on 8/6/2024 at 10:09 AM

## 2024-08-06 NOTE — ED NOTES
ROOSEVELT met with pt who reports use of some alcohol tonight. Pt also reported to staff that he drank cleaning fluid. Pt was unsure of what the cleaning solution was but said it was some sort of cleaning solution for countertops. ROOSEVELT explained to pt she will assess him once ethanol level results. Pt voices understanding.

## 2024-08-06 NOTE — BH NOTE
PRN note    Emergency PRN Medication Administration Note:      Patient is Agitated, Disruptive, and Threat of Lethal as evidence by pacing outside his door, blocking his door, yelling that other patients were  after him, paranoid about his room being \"bugged\" and recorded to turn him in to the , and threatening to kill himself in the day room. Staff attempted to find and relieve the distress by Talking to patient, Refocusing on new activity, Offering suggestions, and Administer PRN medications Patient is currently continuing to escalate. Additional staff and security called at this time. On call provider notified of patient behaviors and placed orders. Medication Administered as prescribed: IM PRN haldol 5 mg, ativan 2 mg, and benadryl 50 mg. Patient Tolerated medication administration.   Will continue to monitor, offer support, and reassess.

## 2024-08-06 NOTE — ED NOTES
..Transfer Center Handoff for [facility] Trans[facility]      Patient's Current Location: [facility] ED     Chief Complaint   Patient presents with    Suicidal       Current or History of Violent Behavior: No    Currently in Restraints Now or During this Encounter: No  (Specify if Agitation or self harm is noted in ED?)  If yes, please describe behaviors requiring restraint:             Medical Clearance Documented and Verified in the Chart: No    No Known Allergies     Can Patient Tolerate Lying Flat: Yes    Able to Perform ADLs:  Yes  (Specify if able to ambulate or uses any mobility devices such as cane or walker)  Activity: In bed  Level of Assistance:    Assistive Device:    Miscellaneous Devices:      LABS    CBC:   Lab Results   Component Value Date/Time    WBC 5.4 08/06/2024 03:32 AM    RBC 6.18 08/06/2024 03:32 AM    HGB 18.9 08/06/2024 03:32 AM    HCT 57.1 08/06/2024 03:32 AM    MCV 92.4 08/06/2024 03:32 AM    MCH 30.6 08/06/2024 03:32 AM    MCHC 33.1 08/06/2024 03:32 AM    RDW 13.4 08/06/2024 03:32 AM    PLT See Reflexed IPF Result 08/06/2024 03:32 AM    MPV 10.5 06/21/2024 09:35 PM     CMP:   Lab Results   Component Value Date/Time     08/06/2024 03:32 AM    K 3.9 08/06/2024 03:32 AM     08/06/2024 03:32 AM    CO2 23 08/06/2024 03:32 AM    BUN 20 08/06/2024 03:32 AM    CREATININE 0.9 08/06/2024 03:32 AM    GFRAA >60 01/05/2014 07:13 AM    LABGLOM >90 08/06/2024 03:32 AM    GLUCOSE 112 08/06/2024 03:32 AM    CALCIUM 8.8 08/06/2024 03:32 AM    BILITOT 0.8 08/06/2024 03:32 AM    ALKPHOS 113 08/06/2024 03:32 AM    AST 22 08/06/2024 03:32 AM    ALT 14 08/06/2024 03:32 AM     Drug Panel:   Lab Results   Component Value Date/Time    OPIAU NEGATIVE 08/06/2024 03:34 AM     UA:  Lab Results   Component Value Date/Time    COLORU SHRADDHA 12/27/2013 11:30 PM    PROTEINU NEGATIVE 12/27/2013 11:30 PM    GLUCOSEU NEGATIVE 12/27/2013 11:30 PM    KETUA NEGATIVE 12/27/2013 11:30 PM    BILIRUBINUR   etc.)    Does the patient have confirmed or suspected COVID-19 Symptoms: No  (if yes, test must be completed, if no test is not required)       Last COVID Lab No results found for: \"SARS-COV-2\", \"SARS-COV-2 RNA\", \"SARS-COV-2 RNA, RT PCR\", \"SARS-COV-2, SHANIKA\", \"SARS-COV-2, NAAT\", \"SARS-COV-2 BY PCR\", \"RAPID\", \"SARS-COV-2, RAPID\", \"SALIVA\", \"SARS-COV-2, SALIVA\"           Immunization status:   There is no immunization history on file for this patient.

## 2024-08-06 NOTE — GROUP NOTE
Group Therapy Note    Date: 8/6/2024    Group Start Time: 1330  Group End Time: 1415  Group Topic: Psychoeducation    [facility]    Heidy Parnell CTRS    Group Therapy Note    Attendees: 8/18     Patient's Goal:  Patient will identify benefits of music for coping and stress management    Notes:  Patient attended group and participated    Status After Intervention:  Improved    Participation Level: Active Listener and Interactive    Participation Quality: Appropriate, Attentive, Sharing, and Supportive      Speech:  slurred      Thought Process/Content: Flight of ideas      Affective Functioning: Constricted/Restricted      Mood: euthymic      Level of consciousness:  Alert and Attentive      Response to Learning: Able to verbalize current knowledge/experience, Able to verbalize/acknowledge new learning, Able to change behavior, and Progressing to goal      Endings: None Reported    Modes of Intervention: Education, Support, Socialization, and Exploration      Discipline Responsible: Psychoeducational Specialist      Signature:  MATILDE Godoy

## 2024-08-06 NOTE — PROGRESS NOTES
08/06/24 1547   Encounter Summary   Encounter Overview/Reason Loneliness/Social Isolation   Service Provided For Patient   Last Encounter  08/06/24   Assessment/Intervention/Outcome   Assessment Loneliness

## 2024-08-06 NOTE — GROUP NOTE
Group Therapy Note    Date: 8/6/2024    Group Start Time: 1030  Group End Time: 1115  Group Topic: Psychoeducation    [facility] Heidy Garcia CTRS    Group Therapy Note    Attendees: 10/22     Patient's Goal:  Patient will demonstrate improved interpersonal skills and offer peer support    Notes:  Patient attended group and participated.     Status After Intervention:  Improved    Participation Level: Active Listener and Interactive    Participation Quality: Appropriate, Attentive, and Sharing      Speech:  normal      Thought Process/Content: Logical  Linear      Affective Functioning: Constricted/Restricted      Mood: euthymic      Level of consciousness:  Alert, Oriented x4, and Attentive      Response to Learning: Able to verbalize current knowledge/experience, Able to verbalize/acknowledge new learning, Able to retain information, Able to change behavior, and Progressing to goal      Endings: None Reported    Modes of Intervention: Education, Support, Socialization, and Exploration      Discipline Responsible: Psychoeducational Specialist      Signature:  MATILDE Godoy

## 2024-08-06 NOTE — PROGRESS NOTES
08/06/24 1545   Encounter Summary   Encounter Overview/Reason Behavioral Health   Service Provided For Patient   Last Encounter  08/06/24   Behavioral Health    Type  Spirituality Group   Assessment/Intervention/Outcome   Assessment Anxious   Intervention Active listening;Discussed belief system/Hindu practices/howie;Discussed illness injury and it’s impact;Discussed meaning/purpose;Explored/Affirmed feelings, thoughts, concerns;Explored Coping Skills/Resources;Nurtured Hope;Prayer (assurance of)/[place];Sustaining Presence/Ministry of presence   Outcome Receptive

## 2024-08-06 NOTE — BH NOTE
Orders for suicide precautions and 1:1 constant observer discontinued. Patient able to contract for safety on unit. Provider notified and declines order for 1:1 sitter. Q15 minute and random safety checks maintained.

## 2024-08-06 NOTE — ED PROVIDER NOTES
[facility] ED  Emergency Department  Emergency Medicine Resident Turn-Over     Care of Norbert Bravo was assumed from Dr. Iqbal and is being seen for Suicidal  .  The patient's initial evaluation and plan have been discussed with the prior provider who initially evaluated the patient.     EMERGENCY DEPARTMENT COURSE / MEDICAL DECISION MAKING:       MEDICATIONS GIVEN:  No orders of the defined types were placed in this encounter.      LABS / RADIOLOGY:     Labs Reviewed   CBC WITH AUTO DIFFERENTIAL - Abnormal; Notable for the following components:       Result Value    RBC 6.18 (*)     Hemoglobin 18.9 (*)     Hematocrit 57.1 (*)     All other components within normal limits   COMPREHENSIVE METABOLIC PANEL - Abnormal; Notable for the following components:    Glucose 112 (*)     All other components within normal limits   TOX SCR, BLD, ED - Abnormal; Notable for the following components:    Acetaminophen Level <5 (*)     All other components within normal limits   URINE DRUG SCREEN - Abnormal; Notable for the following components:    Amphetamine Screen, Ur POSITIVE (*)     Cannabinoid Scrn, Ur POSITIVE (*)     All other components within normal limits   PROTIME-INR   LIPASE   LACTIC ACID   POC BLOOD GAS AND CHEMISTRY       No results found.    RECENT VITALS:     Temp: 99 °F (37.2 °C),  Pulse: 78, Respirations: 16, BP: 121/85, SpO2: 99 %    This patient is a 33 y.o. Male with   Suicidality  Drank some  reportedly  Acting appropriately the entire time he's been here  Eating, drinking   Accepted to [facility]  EMTALA completed    ED Course as of 08/06/24 1522   Tue Aug 06, 2024   0520 Amphetamine Screen, Ur(!): POSITIVE [KB]   0520 Cannabinoid Scrn, Ur(!): POSITIVE [KB]   0520 WBC: 5.4 [KB]   0520 Acetaminophen Level(!): <5 [KB]   0520 Ethanol Lvl: <10 [KB]   0520 Ethanol percent: <0.010 [KB]   0520 Salicyclic Acid: <0.5 [KB]   0520 Lipase: 14 [KB]   0520 CMP(!):    Sodium 136   Potassium 3.9

## 2024-08-06 NOTE — H&P
IN-PATIENT SERVICE  [facility]  [facility]   Guru Eldridge MD; Jacob Mcintosh MD; Ankur Espinosa MD; Luis Peacock MD,   Donna Pearson MD; Monet Grider MD; Brooklyn Carreno MD; Ken Falcon MD; Fam Marti MD    HISTORY AND PHYSICAL EXAMINATION/ CONSULT NOTE            Date:   8/6/2024  Patient name:  Norbert Bravo  Date of admission:  8/6/2024  9:22 AM  MRN:   [id number]  Account:  [id number]  YOB: 1990  PCP:    No primary care provider on file.  Room:   [address] [place] [address]  Code Status:    Full Code    Physician Requesting Consult: Boby Mueller MD    Reason for Consult: History and physical, medical management    Chief Complaint:     No chief complaint on file.    Medical management    History Obtained From:     Patient, EMR, nursing staff    History of Present Illness:     33-year-old male admitted for depression with suicidal ideation    No significant medical history  Patient denies any chest pain palpitation cough difficulty breathing nausea vomiting diarrhea or urinary symptoms      Past Medical History:     Past Medical History:   Diagnosis Date    Depression     Suicidal thoughts         Past Surgical History:     No past surgical history on file.     Medications Prior to Admission:     Prior to Admission medications    Medication Sig Start Date End Date Taking? Authorizing Provider   magnesium hydroxide (MILK OF MAGNESIA) 400 MG/5ML suspension Take 30 mLs by mouth daily as needed for Constipation  Patient not taking: Reported on 8/6/2024 6/21/24   Home Dasilva MD        Allergies:     Patient has no known allergies.    Social History:     Tobacco:    reports that he has been smoking cigarettes. He does not have any smokeless tobacco history on file.  Alcohol:      reports current alcohol use of about 4.2 standard drinks of alcohol per week.  Drug Use:  reports current drug use. Drugs: IV and Marijuana  Total Bilirubin 0.8 0.00 - 1.20 mg/dL    Alkaline Phosphatase 113 40 - 129 U/L    ALT 14 10 - 50 U/L    AST 22 10 - 50 U/L   Protime-INR    Collection Time: 08/06/24  3:32 AM   Result Value Ref Range    Protime 14.0 11.7 - 14.9 sec    INR 1.1    Lipase    Collection Time: 08/06/24  3:32 AM   Result Value Ref Range    Lipase 14 13 - 60 U/L   Lactic Acid    Collection Time: 08/06/24  3:32 AM   Result Value Ref Range    Lactic Acid, Whole Blood 1.3 0.7 - 2.1 mmol/L   TOX SCR, BLD, ED    Collection Time: 08/06/24  3:32 AM   Result Value Ref Range    Acetaminophen Level <5 (L) 10 - 30 ug/mL    Ethanol Lvl <10 <10 mg/dL    Ethanol percent <0.010 <0.010 %    Salicylate Lvl <0.5 0.0 - 10.0 mg/dL   Urine Drug Screen    Collection Time: 08/06/24  3:34 AM   Result Value Ref Range    Amphetamine Screen, Ur POSITIVE (A) NEGATIVE    Barbiturate Screen, Ur NEGATIVE NEGATIVE    Benzodiazepine Screen, Urine NEGATIVE NEGATIVE    Cocaine Metabolite, Urine NEGATIVE NEGATIVE    Methadone Screen, Urine NEGATIVE NEGATIVE    Opiates, Urine NEGATIVE NEGATIVE    Phencyclidine, Urine NEGATIVE NEGATIVE    Cannabinoid Scrn, Ur POSITIVE (A) NEGATIVE    Oxycodone Screen, Ur NEGATIVE NEGATIVE    Fentanyl, Ur NEGATIVE NEGATIVE    Test Information       Assay provides rapid clinical screening only.  Presumptive positive results for legal purposes should be confirmed by another method.  To request confirmation, please call the lab within 7 days of sample submission.       Imaging/Diagonstics:  Recent data reviewed    Assessment :      Primary Problem  Major depressive disorder, recurrent, severe with psychotic features (HCC)    Active Hospital Problems    Diagnosis Date Noted    Depression with suicidal ideation [F32.A, R45.851] 08/06/2024    Major depressive disorder, recurrent, severe with psychotic features (HCC) [F33.3] 08/06/2024       Plan:     Reason for consult: General medical management     Depression with suicidal ideation-management per

## 2024-08-06 NOTE — ED NOTES
[] [facility]    [] [facility]    [x]  [facility]    SUICIDE RISK ASSESSMENT      Y  N     [x] [] In the past two weeks have you had thoughts of hurting yourself in any way?    [x] [] In the past two weeks have you had thoughts that you would be better off dead?   [x] [] Have you made a suicide attempt in the past two months?   [x] [] Do you have a plan for hurting yourself or suicide?   [x] [] Presence of hallucinations/voices related to hurting himself or herself or someone else.    SUICIDE/SECURITY WATCH PRECAUTION CHECKLIST     Orders    [x]  Suicide/Security Watch Precautions initiated as checked below:   8/6/24 3:19 AM EDT 26/H26A    [x] Notified physician:  Thuy Padilla MD  8/6/24 3:19 AM EDT    [x] Orders obtained as appropriate:     [x] 1:1 Observer     [] Psych Consult     [] Psych Consult    Name:  Date:  Time:    [x] 1:1 Observer, Notified by:  Zay Arthur RN    Contact Nurse Supervisor    [x] Remove all personal clothes from room and place in snap/paper gown/pants.  Slipper only    [x] Remove all personal belongings from room and secured away from patient.    Documentation    [x] Initiate Suicide/Security Watch Precaution Flow Sheet    [x] Initiate individualized Care Plan/Problem    [x] Document why precautions initiated on flow sheet (Initiate Nursing Care Plan/Problem)    [x] 1:1 Observer in place; instructions provided.  Suicide precautions require observer be within arms length.    [x] Nurse-Observer Communication Hand-off initiated by RN, reviewed with Observer.  Subsequently used as Hand Off between Observers.    [x] Initiate every 15 minute observations per observer as delegated by the RN.    [x] Initiate RN assessment and documentation    Environmental Scan  Search Criteria and Process: OPTIONAL, see Search Policy    [x] Reason for search:    [x] Nursing in presence of second person to search patient    [] Patient notified of reason for body assessment and  (related to)  [x] Expressed or implied suicidal ideation/behavior  [x] Depression  [] Suicide attempt      [x] Low self-esteem  [x] Hallucinations      [x] Feeling of Hopelessness  [] Substance abuse or withdrawal    [] Dysfunctional family  [] Major traumatic event, eg., divorce, etc   [x] Excessive stress/anxiety    8/6/24    Expected Outcomes    Patient will:   [x] Patient will remain safe for the duration of their stay   [x] Patient's environment will be safe, eg. Free of potential suicide weapons   [] Verbalize Recovery from suicidal episode and improvement in self-worth   [x] Discuss feeling that precipitated suicide attempt/thoughts/behavior   [] Will describe available resources for crisis prevention and management   [] Will verbalize positive coping skills     Nursing Intervention   [x] Assessment and Observations hourly   [x] Suicide Precautions implemented with patient, should be 1:1 observation   [x] Document observation [id number] and RN assessment hourly   [] Consult physician for:    [] Psychiatric consult    [] Pharmacological therapy    [] Other:    [x] Patient search completed by security   [x] Initiated appropriate safety protocols by removing from the patient's environment anything that could be used to inflict self injury, eg. Order safe tray, snap gown, etc   [x] Maintain open, warm, caring, non-judgmental attitude/manner towards patient   [] Discuss advantages and disadvantages of existing coping methods/skills   [x] Assist and educate patient with identifying present strengths and coping skills   [x] Keep patient informed regarding plan of care and provide clear concise explanations.  Provide the patient/family education information as well as telephone numbers and other information about crisis centers, hot lines, and counselors.    Discharge Planning:   [] Referral  [] Groups [] Health agencies  [] Other:

## 2024-08-06 NOTE — ED PROVIDER NOTES
Faculty Sign-Out Attestation  Handoff taken on the following patient from prior Attending Physician: Valerie    Note Started: 7:28 AM EDT    I was available and discussed any additional care issues that arose and coordinated the management plans with the resident(s) caring for the patient during my duty period. Any areas of disagreement with resident’s documentation of care or procedures are noted on the chart. I was personally present for the key portions of any/all procedures during my duty period. I have documented in the chart those procedures where I was not present during the key portions.    Medically cleared, awaiting transport to [facility].    Hasmukh Carter MD  Attending Physician        Hasmukh Carter MD  08/06/24 0792

## 2024-08-06 NOTE — ED NOTES
CLEMENTE received. Pt was accepted by Dr. Mark Covarrubias. Pt signed voluntary form and it was faxed to [facility]. SW waiting for MA to arrange transport and provide ETA then will fax transport paperwork.

## 2024-08-06 NOTE — H&P
Department of Psychiatry  Attending Physician Psychiatric Assessment     Reason for Admission to Psychiatric Unit:  Threat to self requiring 24 hour professional observation  Concerns about patient's safety in the community  Past Mental Health Diagnosis: a history of  Major Depression  Triggering event or precipitating factor: Housing instability, Financial instability, Alcohol/Drug Relapse, Relationship Issues, and Psych Treatment Noncompliance  Length of time/duration of triggering event: Years  Legal Status: Voluntary    CHIEF COMPLAINT:  Depression with suicidal ideation    History obtained from: Patient, electronic medical record          HISTORY OF PRESENT ILLNESS:    Norbert Bravo is a 33 y.o. male who has a past medical history of depression and polysubstance abuse who presents to the ER with increased depression and suicidal ideation with a plan to jump off a bridge.  Norbert was last admitted to this facility back in 2014. He was discharged on Celexa at that time.  Norbert is agreeable to assessment in unit sensory room today.  He is actively withdrawing from opioids however COWS protocol was initiated.  He continues to be very restless, is constantly sniffling his nose, sweating and his pupils are dilated.  When asked about events leading up to hospitalization Norbert reports having a very poor support system.  He reports that he lost both of his parents at a very young age.  He reports his mom passed away when he was 14 and shortly after that his father was diagnosed with dementia and throughout his highschool years he took care of his father until he eventually passed away.  He reports shortly after the death of his father he continued to struggle and started using drugs to self-medicate.  He reports that he was using Heroin reguarly but in 2012 did get clean for about 4-5 years.  He reports that back in 2018 he relapsed and has been using ever since.  He reports he is tired of his drug use and

## 2024-08-06 NOTE — ED NOTES
Report given to Marilee GREEN; all questions addressed.    Zena,  Please let patient know I have approve a 20 day prescription which should last her until follow up visit of 12/19/19  Tila Wade MD

## 2024-08-07 LAB
BASOPHILS # BLD: 0 K/UL (ref 0–0.2)
BASOPHILS NFR BLD: 1 % (ref 0–2)
EOSINOPHIL # BLD: 0.2 K/UL (ref 0–0.4)
EOSINOPHILS RELATIVE PERCENT: 3 % (ref 0–4)
ERYTHROCYTE [DISTWIDTH] IN BLOOD BY AUTOMATED COUNT: 14.2 % (ref 11.5–14.9)
HCT VFR BLD AUTO: 40.3 % (ref 41–53)
HGB BLD-MCNC: 13.1 G/DL (ref 13.5–17.5)
LYMPHOCYTES NFR BLD: 1.8 K/UL (ref 1–4.8)
LYMPHOCYTES RELATIVE PERCENT: 28 % (ref 24–44)
MCH RBC QN AUTO: 30.6 PG (ref 26–34)
MCHC RBC AUTO-ENTMCNC: 32.6 G/DL (ref 31–37)
MCV RBC AUTO: 93.8 FL (ref 80–100)
MONOCYTES NFR BLD: 0.7 K/UL (ref 0.1–1.3)
MONOCYTES NFR BLD: 11 % (ref 1–7)
NEUTROPHILS NFR BLD: 57 % (ref 36–66)
NEUTS SEG NFR BLD: 3.9 K/UL (ref 1.3–9.1)
PLATELET # BLD AUTO: 202 K/UL (ref 150–450)
PMV BLD AUTO: 8.1 FL (ref 6–12)
RBC # BLD AUTO: 4.29 M/UL (ref 4.5–5.9)
WBC OTHER # BLD: 6.7 K/UL (ref 3.5–11)

## 2024-08-07 PROCEDURE — 1240000000 HC EMOTIONAL WELLNESS R&B

## 2024-08-07 PROCEDURE — 36415 COLL VENOUS BLD VENIPUNCTURE: CPT

## 2024-08-07 PROCEDURE — 6370000000 HC RX 637 (ALT 250 FOR IP)

## 2024-08-07 PROCEDURE — 6370000000 HC RX 637 (ALT 250 FOR IP): Performed by: PSYCHIATRY & NEUROLOGY

## 2024-08-07 PROCEDURE — 99231 SBSQ HOSP IP/OBS SF/LOW 25: CPT | Performed by: INTERNAL MEDICINE

## 2024-08-07 PROCEDURE — 6360000002 HC RX W HCPCS

## 2024-08-07 PROCEDURE — 85025 COMPLETE CBC W/AUTO DIFF WBC: CPT

## 2024-08-07 PROCEDURE — 99232 SBSQ HOSP IP/OBS MODERATE 35: CPT | Performed by: PSYCHIATRY & NEUROLOGY

## 2024-08-07 RX ADMIN — PALIPERIDONE 3 MG: 3 TABLET, EXTENDED RELEASE ORAL at 08:50

## 2024-08-07 RX ADMIN — CLONIDINE HYDROCHLORIDE 0.1 MG: 0.1 TABLET ORAL at 22:44

## 2024-08-07 RX ADMIN — BUPRENORPHINE AND NALOXONE 2 FILM: 2; .5 FILM BUCCAL; SUBLINGUAL at 17:29

## 2024-08-07 ASSESSMENT — PAIN SCALES - GENERAL: PAINLEVEL_OUTOF10: 1

## 2024-08-07 NOTE — PROGRESS NOTES
BEHAVIORAL HEALTH FOLLOW-UP NOTE     8/7/2024     Patient was seen and examined in person, Chart reviewed   Patient's case discussed with staff/team    Chief Complaint:  Depression with suicidal ideation     Interim History:     Patient was seen at bedside today. Elevated /119 and elevated pulse 108 since yesterday morning. Patient refused to wake to take her Invega 3mg last night 8/6 but had it this morning. Patient received Suboxone, Catapres 0.1mg, Flexeril 10mg, Benadryl 50mg/ml, Haldol 5mg/ml, Atarax 50mg, and Ativan 2mg/ml. Labs from 8/6 show elevated (112) glucose, elevated (6.18) RBC, elevated (18.9) hemoglobin and elevated (57.1) hematocrit. UDS positive for cannabinoids and amphetamines.     Vitals:    08/06/24 1617   BP: (!) 154/119   Pulse: (!) 108   Resp:    Temp:    SpO2:      Patient is asleep but wakes on calling his name. He does not sit up and falls asleep a few times while speaking. Patient is alert and oriented to person, place, and reason for admission. Patient responds that it is September. He denies suicidal ideation and depressive symptoms and says he's \"just tired.\" He says he feels less anxious this morning but was very anxious yesterday- per nurse he was agitated and disruptive. He reports sleeping and eating fine. He denies visual or auditory hallucinations today.     HPI:  Norbert Bravo is a 33 y.o. male who has a past medical history of depression and polysubstance abuse who presents to the ER with increased depression and suicidal ideation with a plan to jump off a bridge.  Norbert was last admitted to this facility back in 2014. He was discharged on Celexa at that time. He is actively withdrawing from opioids however COWS protocol was initiated. He is restless, sweating, with dilated pupils. He reports having a very poor support system. He reports that he lost both of his parents at a very young age. He reports his mom passed away when he was 14 and shortly after that his  anxiety. He endorses panic attacks that have been more prevalent lately. He reports getting tearful, not being able to catch his breath, and an impending sense of doom. He denies obsessive-compulsive thoughts or behaviors. He also reports that while he was caring for his father he had to \"do anything\" to get money to pay the bills. He does not really elaborate on what he means by this. He does endorse nightmares and vivid flashbacks related to these events. He also has a long history of polysubstance abuse since 2012. He reports his longest period of sobriety was 5 years which he did with the help of a friend who let him stay with him. He reports that he relapsed in 2018 and has been using ever since.     BP (!) 154/119   Pulse (!) 108   Temp 97.4 °F (36.3 °C) (Oral)   Resp 16   Ht 1.727 m (5' 8\")   Wt 66.4 kg (146 lb 6.4 oz)   SpO2 98%   BMI 22.26 kg/m²   Appetite:   [x] Normal/Unchanged  [] Increased  [] Decreased      Sleep:       [x] Normal/Unchanged  [] Fair       [] Poor              Energy:    [x] Normal/Unchanged  [] Increased  [] Decreased        Aggression:  [] yes  [x] no    Patient is [] able  [] unable to CONTRACT FOR SAFETY ON THE UNIT    PAST MEDICAL/PSYCHIATRIC HISTORY:   Past Medical History:   Diagnosis Date    Depression     Suicidal thoughts        FAMILY/SOCIAL HISTORY:  No family history on file.  Social History     Socioeconomic History    Marital status: Single     Spouse name: Not on file    Number of children: Not on file    Years of education: Not on file    Highest education level: Not on file   Occupational History    Not on file   Tobacco Use    Smoking status: Every Day     Types: Cigarettes    Smokeless tobacco: Not on file   Substance and Sexual Activity    Alcohol use: Yes     Alcohol/week: 4.2 standard drinks of alcohol     Types: 5 drink(s) per week     Comment: occasional    Drug use: Yes     Types: IV, Marijuana (Weed)     Comment: heroin    Sexual activity: Not on file                    Norbert Bravo is a 33 y.o. male being evaluated face to face.     --Mari Pastor on 8/7/2024 at 10:05 AM    An electronic signature was used to authenticate this note.     **This report has been created using voice recognition software. It may contain minor errors which are inherent in voice recognition technology.**  I independently saw and evaluated the patient.  I reviewed the  documentation above    .  Any additional comments or changes to the   documentation are stated below otherwise agree with assessment.      QTc Calculation (Bazett)   Date Value Ref Range Status   01/04/2014 451 ms Final       Vitals:    08/06/24 0934 08/06/24 1414 08/06/24 1617 08/07/24 1000   BP:  120/86 (!) 154/119    Pulse:  (!) 102 (!) 108    Resp:       Temp:       TempSrc:       SpO2:       Weight: 66.4 kg (146 lb 6.4 oz)      Height: 1.727 m (5' 8\")   1.727 m (5' 8\")         Current Facility-Administered Medications   Medication Dose Route Frequency Provider Last Rate Last Admin    acetaminophen (TYLENOL) tablet 650 mg  650 mg Oral Q4H PRN Sulma Lau APRN - CNP        polyethylene glycol (GLYCOLAX) packet 17 g  17 g Oral Daily PRN Sulma Lau APRN - CNP        hydrOXYzine HCl (ATARAX) tablet 50 mg  50 mg Oral TID PRN Sulma Lau APRN - CNP   50 mg at 08/06/24 1228    traZODone (DESYREL) tablet 50 mg  50 mg Oral Nightly PRN Sulma Lau APRN - CNP        aluminum & magnesium hydroxide-simethicone (MAALOX) 200-200-20 MG/5ML suspension 30 mL  30 mL Oral Q6H PRN Sulma Lau APRN - CNP        cyclobenzaprine (FLEXERIL) tablet 10 mg  10 mg Oral TID PRN Rosenda Kay APRN - CNP   10 mg at 08/06/24 1228    ondansetron (ZOFRAN) tablet 4 mg  4 mg Oral TID PRN Rosenda Kay APRN - CNP        dicyclomine (BENTYL) tablet 20 mg  20 mg Oral 4x Daily PRN Eliza, Rosenda, APRN - CNP        cloNIDine (CATAPRES) tablet 0.1 mg  0.1 mg Oral Q4H PRN Rosenda Kay, DILCIA - CNP   0.1 mg at 08/06/24 8654

## 2024-08-07 NOTE — PROGRESS NOTES
Behavioral Services  Medicare Certification Upon Admission    I certify that this patient's inpatient psychiatric hospital admission is medically necessary for:    [x] (1) Treatment which could reasonably be expected to improve this patient's condition,       [x] (2) Or for diagnostic study;     AND     [x](2) The inpatient psychiatric services are provided while the individual is under the care of a physician and are included in the individualized plan of care.    Estimated length of stay/service 2-9 days    Plan for post-hospital care -outpatient care    Electronically signed by TEDDY NELSON MD on 8/6/2024 at 9:26 PM

## 2024-08-07 NOTE — PROGRESS NOTES
Comprehensive Nutrition Assessment    Type and Reason for Visit:  Positive Nutrition Screen (wt loss)    Nutrition Recommendations/Plan:   Will continue Regular diet and Ensure Plus High Protein on all trays     Malnutrition Assessment:  Malnutrition Status:  At risk for malnutrition (Comment) (08/07/24 1020)    Context:  Acute Illness     Findings of the 6 clinical characteristics of malnutrition:  Energy Intake:  75% or less of estimated energy requirements for 7 or more days  Weight Loss:  7.5% over 3 months     Body Fat Loss:  Unable to assess     Muscle Mass Loss:  Unable to assess    Fluid Accumulation:  No significant fluid accumulation     Strength:  Not Performed    Nutrition Assessment:    Pt admitted to [facility] due to Suicidal ideation. Wt history suggests pt is down by 21#over the past 6 weeks.    Nutrition Related Findings:    no edema, Labs/Meds: Reviewed, PMH: Polysubstance Abuse Wound Type: None       Current Nutrition Intake & Therapies:    Average Meal Intake: Unable to assess     ADULT DIET; Regular  ADULT ORAL NUTRITION SUPPLEMENT; Breakfast, Lunch, Dinner; Standard High Calorie/High Protein Oral Supplement    Anthropometric Measures:  Height: 172.7 cm (5' 8\")  Ideal Body Weight (IBW): 154 lbs (70 kg)    Admission Body Weight: 66.2 kg (146 lb)  Current Body Weight: 66.2 kg (146 lb) (actual), 94.8 % IBW. Weight Source: Standing Scale  Current BMI (kg/m2): 22.2  Usual Body Weight: 75.8 kg (167 lb) (6/21/24)  % Weight Change (Calculated): -12.6                    BMI Categories: Normal Weight (BMI 18.5-24.9)    Estimated Daily Nutrient Needs:  Energy Requirements Based On: Formula  Weight Used for Energy Requirements: Admission  Energy (kcal/day): [place] x 1.2= 1900 kcal  Weight Used for Protein Requirements: Admission  Protein (g/day): 1.2g/kg= 80 g  Method Used for Fluid Requirements: 1 ml/kcal    Nutrition Diagnosis:   Predicted inadequate energy intake related to psychological cause or life  stress as evidenced by poor intake prior to admission    Nutrition Interventions:   Food and/or Nutrient Delivery: Continue Current Diet, Continue Oral Nutrition Supplement  Nutrition Education/Counseling: No recommendation at this time  Coordination of Nutrition Care: Continue to monitor while inpatient       Goals:     Goals: PO intake 75% or greater       Nutrition Monitoring and Evaluation:      Food/Nutrient Intake Outcomes: Food and Nutrient Intake, Supplement Intake  Physical Signs/Symptoms Outcomes: Biochemical Data, GI Status, Fluid Status or Edema, Weight, Skin    Discharge Planning:    Continue current diet     Francheska Chang RD, LD  Contact: [phone]

## 2024-08-07 NOTE — GROUP NOTE
Group Therapy Note    Date: 8/7/2024    Group Start Time: 1000  Group End Time: 1045  Group Topic: Psychoeducation    Carolin Duran, HARINDERW        Group Therapy Note    Attendees: 6/21       patient refused to attend psychoeducation group at 10a after encouragement from staff.  1:1 talk time provided as alternative to group session

## 2024-08-07 NOTE — GROUP NOTE
Psych-Ed/Relapse Prevention Group Note        Date: August 7, 2024 Start Time:  11:15am   End Time: 11:45am      Number of Participants in Group & Unit Census:  8/21    Topic: Mental Health Advocacy    Goal of Group:Patient will identify benefits of mental health advocacy to decrease stigma and increase access to care.        Comments:     Patient did not participate in Psych-Ed/Relapse Prevention group, despite staff encouragement and explanation of benefits.  Patient remain seclusive to self.  Q15 minute safety checks maintained for patient safety and will continue to encourage patient to attend unit programming.         Signature:  DEBORAH GodoyS

## 2024-08-07 NOTE — GROUP NOTE
Group Refusal Note:     Patient refused to attend goals group even after encouragement from staff.  will continue to monitor and support.

## 2024-08-07 NOTE — PLAN OF CARE
Problem: Self Harm/Suicidality  Goal: Will have no self-injury during hospital stay  Description: INTERVENTIONS:  1.  Ensure constant observer at bedside with Q15M safety checks  2.  Maintain a safe environment  3.  Secure patient belongings  4.  Ensure family/visitors adhere to safety recommendations  5.  Ensure safety tray has been added to patient's diet order  6.  Every shift and PRN: Re-assess suicidal risk via Frequent Screener    8/7/2024 1118 by Essex, Matthew, RN  Outcome: Progressing  Note: Pt denies having suicidal or homicidal ideations. Pt denies having depression, but reports having anxiety. Pt reported having an improvement in his mood and paranoia since yesterday. Pt has been cooperative with staff and compliant with medical treatment. Reports having an adequate diet and sleep.     Problem: Pain  Goal: Verbalizes/displays adequate comfort level or baseline comfort level  8/7/2024 1118 by Essex, Matthew, RN  Outcome: Progressing

## 2024-08-07 NOTE — PROGRESS NOTES
IN-PATIENT SERVICE  [facility] Internal Medicine  [facility] Internal Medicine   Guru Eldridge MD; Jacob Mcintosh MD; Ankur Espinosa MD; Luis Peacock MD,   Donna Pearson MD; Monet Grider MD; Brooklyn Carreno MD; Ken Falcon MD; Fam Marti MD    HISTORY AND PHYSICAL EXAMINATION/ CONSULT NOTE            Date:   8/7/2024  Patient name:  Norbert Bravo  Date of admission:  8/6/2024  9:22 AM  MRN:   [id number]  Account:  [id number]  YOB: 1990  PCP:    No primary care provider on file.  Room:   [address] [place] [address]  Code Status:    Full Code    Physician Requesting Consult: Boby Mueller MD    Reason for Consult: History and physical, medical management    Chief Complaint:     No chief complaint on file.    Medical management    History Obtained From:     Patient, EMR, nursing staff    History of Present Illness:     33-year-old male admitted for depression with suicidal ideation    No significant medical history  Patient denies any chest pain palpitation cough difficulty breathing nausea vomiting diarrhea or urinary symptoms      Past Medical History:     Past Medical History:   Diagnosis Date    Depression     Suicidal thoughts         Past Surgical History:     No past surgical history on file.     Medications Prior to Admission:     Prior to Admission medications    Medication Sig Start Date End Date Taking? Authorizing Provider   magnesium hydroxide (MILK OF MAGNESIA) 400 MG/5ML suspension Take 30 mLs by mouth daily as needed for Constipation  Patient not taking: Reported on 8/6/2024 6/21/24   Home Dasilva MD        Allergies:     Patient has no known allergies.    Social History:     Tobacco:    reports that he has been smoking cigarettes. He does not have any smokeless tobacco history on file.  Alcohol:      reports current alcohol use of about 4.2 standard drinks of alcohol per week.  Drug Use:  reports current drug use. Drugs: IV and Marijuana

## 2024-08-07 NOTE — GROUP NOTE
Psych-Ed/Relapse Prevention Group Note        Date: August 7, 2024 Start Time: 2:30pm  End Time:  3:15pm      Number of Participants in Group & Unit Census:  6/20    Topic: Leisure skills    Goal of Group:Patient will identify benefits of leisure for coping      Comments:     Patient did not participate in Psych-Ed/Relapse Prevention group, despite staff encouragement and explanation of benefits.  Patient remain seclusive to self.  Q15 minute safety checks maintained for patient safety and will continue to encourage patient to attend unit programming.         Signature:  Heidy Parnell CTRS

## 2024-08-07 NOTE — PLAN OF CARE
Problem: Pain  Goal: Verbalizes/displays adequate comfort level or baseline comfort level  8/6/2024 2345 by Princess Hui LPN  Outcome: Progressing  Note: Patient denies pain. Patient is aware medications can be given upon request. Patient is oriented to notify staff of changes regarding pain. Staff continues to monitor patient.        Problem: Self Harm/Suicidality  Goal: Will have no self-injury during hospital stay  Description: INTERVENTIONS:  1.  Ensure constant observer at bedside with Q15M safety checks  2.  Maintain a safe environment  3.  Secure patient belongings  4.  Ensure family/visitors adhere to safety recommendations  5.  Ensure safety tray has been added to patient's diet order  6.  Every shift and PRN: Re-assess suicidal risk via Frequent Screener    8/6/2024 2345 by Princess Hui LPN  Outcome: Progressing  Note: Patient denies thoughts of self-harm or harm to others during this shift. Staff encouraged patient to notify staff if thoughts of self-harm or harm to others occur. Staff ensure patient safety by intermediate checks for every 15 minutes.          Problem: Depression  Goal: Will be euthymic at discharge  Description: INTERVENTIONS:  1. Administer medication as ordered  2. Provide emotional support via 1:1 interaction with staff  3. Encourage involvement in milieu/groups/activities  4. Monitor for social isolation  8/6/2024 2345 by Princess Hui LPN  Outcome: Progressing  Note: Patient expresses feelings of depression & anxiety. Staff ensure safety by providing safety checks on the unit intermittently and every 15 minutes. Patient is encouraged to notify staff if anxiety and depression occurs.          Problem: Anxiety  Goal: Will report anxiety at manageable levels  Description: INTERVENTIONS:  1. Administer medication as ordered  2. Teach and rehearse alternative coping skills  3. Provide emotional support with 1:1 interaction with staff  8/6/2024 2345 by Princess Hui

## 2024-08-08 PROCEDURE — 99232 SBSQ HOSP IP/OBS MODERATE 35: CPT | Performed by: PSYCHIATRY & NEUROLOGY

## 2024-08-08 PROCEDURE — 1240000000 HC EMOTIONAL WELLNESS R&B

## 2024-08-08 PROCEDURE — 6370000000 HC RX 637 (ALT 250 FOR IP): Performed by: PSYCHIATRY & NEUROLOGY

## 2024-08-08 RX ADMIN — PALIPERIDONE 3 MG: 3 TABLET, EXTENDED RELEASE ORAL at 08:51

## 2024-08-08 NOTE — PLAN OF CARE
Problem: Pain  Goal: Verbalizes/displays adequate comfort level or baseline comfort level  8/8/2024 1002 by Arjun Valente LPN  Outcome: Progressing     Problem: Self Harm/Suicidality  Goal: Will have no self-injury during hospital stay  Description: INTERVENTIONS:  1.  Ensure constant observer at bedside with Q15M safety checks  2.  Maintain a safe environment  3.  Secure patient belongings  4.  Ensure family/visitors adhere to safety recommendations  5.  Ensure safety tray has been added to patient's diet order  6.  Every shift and PRN: Re-assess suicidal risk via Frequent Screener    8/8/2024 1002 by Arjun Valente LPN  Outcome: Progressing   Patient denies intent to harm self, remains free from self harm. Support and encouragement provided

## 2024-08-08 NOTE — GROUP NOTE
Group Therapy Note    Date: 8/8/2024    Group Start Time: 1000  Group End Time: 1045  Group Topic: Psychoeducation    STC[facility] BHCarolin Morales LSW        Group Therapy Note    Attendees: 6/23       Patient's Goal:  mindful coping     Notes:      Status After Intervention:  Improved    Participation Level: Active Listener and Interactive    Participation Quality: Appropriate      Speech:  normal      Thought Process/Content: Logical      Affective Functioning: Blunted      Mood: anxious      Level of consciousness:  Alert and Oriented x4      Response to Learning: Progressing to goal      Endings: None Reported    Modes of Intervention: Education and Support      Discipline Responsible: /Counselor      Signature:  GEORGIA Rodriguez

## 2024-08-08 NOTE — PROGRESS NOTES
BEHAVIORAL HEALTH FOLLOW-UP NOTE     8/8/2024     Patient was seen and examined in person, Chart reviewed   Patient's case discussed with staff/team    Chief Complaint: Depression with suicidal ideation     Interim History:     Patient was seen at bedside today. BP and pulse stabilized this morning at 110/68. Patient received Suboxone and Catapres 0.1mg yesterday 8/8. Labs from 8/7 show low (4.29) RBC, low (13.1) hemoglobin, and low (40.3) hematocrit.     Vitals:    08/08/24 0953   BP: 110/68   Pulse: 99   Resp: 14   Temp: 97.5 °F (36.4 °C)   SpO2:      Patient is asleep but wakes on calling his name. Patient is alert and oriented to person, place, time and reason for admission. He denies suicidal ideation. He says he is just \"sleepy all the time.\" He says his mood is better today. He denies symptoms of depression. He denies visual or auditory hallucinations. He says he feels a little anxious. He says he feels \"some withdrawal symptoms\" but that they are manageable. He says he is eating and sleeping fine.     HPI:  Norbert Bravo is a 33 y.o. male who has a past medical history of depression and polysubstance abuse who presents to the ER with increased depression and suicidal ideation with a plan to jump off a bridge.  Norbert was last admitted to this facility back in 2014. He was discharged on Celexa at that time. He is actively withdrawing from opioids however COWS protocol was initiated. He is restless, sweating, with dilated pupils. He reports having a very poor support system. He reports that he lost both of his parents at a very young age. He reports his mom passed away when he was 14 and shortly after that his father was diagnosed with dementia and throughout his highschool years he took care of his father until he eventually passed away. He reports that he was using Heroin reguarly but in 2012 did get clean for about 4-5 years. He reports that back in 2018 he relapsed and has been using ever since. He

## 2024-08-08 NOTE — GROUP NOTE
Psych-Ed/Relapse Prevention Group Note        Date: August 8, 2024 Start Time: 1:30pm  End Time:  2:15pm      Number of Participants in Group & Unit Census:  5/18    Topic: Leisure skills    Goal of Group:Patient will identify benefits of leisure for coping      Comments:     Patient did not participate in Psych-Ed/Relapse Prevention group, despite staff encouragement and explanation of benefits.  Patient remain seclusive to self.  Q15 minute safety checks maintained for patient safety and will continue to encourage patient to attend unit programming.         Signature:  DEBORAH GodoyS

## 2024-08-08 NOTE — PLAN OF CARE
[facility]  Day 3 Interdisciplinary Treatment Plan NOTE    Review Date & Time: 8/8/2024   1242    Admission Type:   Admission Type: Voluntary    Reason for admission:  Reason for Admission: Patient voluntary from [facility]. Presented with suicidal thoughts to jump out of a window or off of high level bridge due to increased drug use and being kicked out of his friend's house. Patient also reports drinking a small amount of cleaning solution before walking to the high level bridge. Patient reports feeling hopeless and helpess due to his housing and financial situation. Patient reports struggling with physical health after an overdose he had a few months ago. Patient reports having to take care of his parents as a child and increasing depression since they passed, stating he has no family left. Patient also reports losing his only friend as he was the one that called the  on him, resulting in his suicidal ideations.  Estimated Length of Stay: 5-7 days  Estimated Discharge Date Update: to be determined by physician    PATIENT STRENGTHS:  Patient Strengths    Patient Strengths and Limitations:Limitations: Inappropriate/potentially harmful leisure interests, Difficulty problem solving/relies on others to help solve problems, Difficult relationships / poor social skills  Addictive Behavior:Addictive Behavior  In the Past 3 Months, Have You Felt or Has Someone Told You That You Have a Problem With  : None  Medical Problems:  Past Medical History:   Diagnosis Date    Depression     Suicidal thoughts        Risk:  Fall Risk   Tk Scale Tk Scale Score: 22  BVC    Change in scores no Changes to plan of Care no    Status EXAM:   Mental Status and Behavioral Exam  Normal: No  Level of Assistance: Independent/Self  Facial Expression: Flat  Affect: Appropriate  Level of Consciousness: Alert  Frequency of Checks: 4 times per hour, close  Mood:Normal: No  Mood: Depressed, Anxious  Motor Activity:Normal:  care    Method:small group, individual verbal education    Outcome:verbalized by patient, but needs reinforcement to obtain goals    PATIENT GOALS:  Short term:\"Get my mind more clear and getting my thoughts straight. Finding medications that help me be less depressed.\"   Long term: \"Keep on medications. Work on sobriety.\"     PLAN/TREATMENT RECOMMENDATIONS UPDATE: continue with group therapies, increased socialization, continue planning for after discharge goals, continue with medication compliance    SHORT-TERM GOALS UPDATE:   Time frame for Short-Term Goals: 5-7 days    LONG-TERM GOALS UPDATE:   Time frame for Long-Term Goals: 6 months  Members Present in Team Meeting: See Signature Sheet    Lashanda Staples, RN

## 2024-08-08 NOTE — PLAN OF CARE
Problem: Pain  Goal: Verbalizes/displays adequate comfort level or baseline comfort level  8/8/2024 0039 by Antonio Perdomo RN  Outcome: Progressing     Problem: Depression  Goal: Will be euthymic at discharge  Description: INTERVENTIONS:  1. Administer medication as ordered  2. Provide emotional support via 1:1 interaction with staff  3. Encourage involvement in milieu/groups/activities  4. Monitor for social isolation  Outcome: Progressing     Problem: Drug Abuse/Detox  Goal: Will have no detox symptoms and will verbalize plan for changing drug-related behavior  Description: INTERVENTIONS:  1. Administer medication as ordered  2. Monitor physical status  3. Provide emotional support with 1:1 interaction with staff  4. Encourage  recovery focused treatment   Outcome: Progressing     Problem: Self Harm/Suicidality  Goal: Will have no self-injury during hospital stay  Description: INTERVENTIONS:  1.  Ensure constant observer at bedside with Q15M safety checks  2.  Maintain a safe environment  3.  Secure patient belongings  4.  Ensure family/visitors adhere to safety recommendations  5.  Ensure safety tray has been added to patient's diet order  6.  Every shift and PRN: Re-assess suicidal risk via Frequent Screener    8/8/2024 0039 by Antonio Perdomo, RN  Outcome: Progressing  Note: Patient denies thoughts to harm self and others. Patient was cooperative with assessment, he reports his mood is \"getting better\". Patient reports withdraw symptoms including anxiety and mild tremors. Patient is able to verbalize pain appropriately. Safe environment maintained, will continue to offer support.

## 2024-08-08 NOTE — BH NOTE
Patient didn't participate in the morning orientation/ goal setting group despite staff invite to attend.  Patient preferred to sleep.

## 2024-08-08 NOTE — ED PROVIDER NOTES
[facility]   Emergency Department  Faculty Attestation       I performed a history and physical examination of the patient and discussed management with the resident. I reviewed the resident’s note and agree with the documented findings including all diagnostic interpretations and plan of care. Any areas of disagreement are noted on the chart. I was personally present for the key portions of any procedures. I have documented in the chart those procedures where I was not present during the key portions. I have reviewed the emergency nurses triage note. I agree with the chief complaint, past medical history, past surgical history, allergies, medications, social and family history as documented unless otherwise noted below.  For Physician Assistant/ Nurse Practitioner cases/documentation I have personally evaluated this patient and have completed at least one if not all key elements of the E/M (history, physical exam, and MDM). Additional findings are as noted.    Patient Name: Norbert Bravo  MRN: [id number]  : 1990  Primary Care Physician: No primary care provider on file.    Date of evaluationa: 2024   Note Started: 11:39 PM EDT    Pertinent Comments     Chief Complaint:   Chief Complaint   Patient presents with    Suicidal        Initial vitals: (If not listed, please see nursing documentation)  ED Triage Vitals   BP Temp Temp Source Pulse Respirations SpO2 Height Weight - Scale   24 0227 24 0227 24 0227 24 0227 24 0227 24 0227 24 0308 24 0308   (!) 155/85 99 °F (37.2 °C) Oral (!) 109 17 92 % 1.727 m (5' 8\") 72.6 kg (160 lb)        HPI/PE/Impression:  This is a 33 y.o. male who presents to the Emergency Department w/ suicidal ideation. Did drinnk some cleaning solution (a sip at 3 pm-lexy). Watned to also jump off a bridge. No other complaints.  Patinet is depressed affect.  Posterior oropharynx clear with no erythema.  Heart sounds

## 2024-08-09 PROCEDURE — 6360000002 HC RX W HCPCS

## 2024-08-09 PROCEDURE — 6370000000 HC RX 637 (ALT 250 FOR IP): Performed by: PSYCHIATRY & NEUROLOGY

## 2024-08-09 PROCEDURE — 99232 SBSQ HOSP IP/OBS MODERATE 35: CPT | Performed by: PSYCHIATRY & NEUROLOGY

## 2024-08-09 PROCEDURE — 1240000000 HC EMOTIONAL WELLNESS R&B

## 2024-08-09 RX ADMIN — PALIPERIDONE 3 MG: 3 TABLET, EXTENDED RELEASE ORAL at 09:26

## 2024-08-09 RX ADMIN — BUPRENORPHINE AND NALOXONE 2 FILM: 2; .5 FILM BUCCAL; SUBLINGUAL at 16:04

## 2024-08-09 ASSESSMENT — PAIN SCALES - GENERAL: PAINLEVEL_OUTOF10: 2

## 2024-08-09 NOTE — PLAN OF CARE
Problem: Self Harm/Suicidality  Goal: Will have no self-injury during hospital stay  Description: INTERVENTIONS:  1.  Ensure constant observer at bedside with Q15M safety checks  2.  Maintain a safe environment  3.  Secure patient belongings  4.  Ensure family/visitors adhere to safety recommendations  5.  Ensure safety tray has been added to patient's diet order  6.  Every shift and PRN: Re-assess suicidal risk via Frequent Screener    Outcome: Progressing     Problem: Depression  Goal: Will be euthymic at discharge  Description: INTERVENTIONS:  1. Administer medication as ordered  2. Provide emotional support via 1:1 interaction with staff  3. Encourage involvement in milieu/groups/activities  4. Monitor for social isolation  Outcome: Progressing   Patient denies intent to harm self, remains free from self harm. Support and encouragement provided

## 2024-08-09 NOTE — GROUP NOTE
Group Therapy Note    Date: 8/9/2024    Group Start Time: 0900  Group End Time: 0930  Group Topic: Group Documentation    [facility] Palma Rodrigues LPN        Group Therapy Note    Attendees: 4/20       Patient's Goal:  inspire    Notes:  educate    Status After Intervention:  Improved    Participation Level: Active Listener    Participation Quality: Appropriate      Speech:  normal      Thought Process/Content: Logical      Affective Functioning: Flat      Mood: depressed      Level of consciousness:  Alert      Response to Learning: Progressing to goal      Endings: None Reported    Modes of Intervention: Education      Discipline Responsible: Licensed Practical Nurse      Signature:  Palma Malloy LPN

## 2024-08-09 NOTE — PLAN OF CARE
Problem: Depression  Goal: Will be euthymic at discharge  Description: INTERVENTIONS:  1. Administer medication as ordered  2. Provide emotional support via 1:1 interaction with staff  3. Encourage involvement in milieu/groups/activities  4. Monitor for social isolation  8/8/2024 2130 by Lawrence Rosenbaum, RN  Outcome: Progressing     Problem: Anxiety  Goal: Will report anxiety at manageable levels  Description: INTERVENTIONS:  1. Administer medication as ordered  2. Teach and rehearse alternative coping skills  3. Provide emotional support with 1:1 interaction with staff  8/8/2024 2130 by Lawrence Rosenbaum, RN  Outcome: Progressing    Pt denies thoughts of self-harm, safety checks maintained Q15 minutes. Reports improved depression and anxiety. Pt is mostly isolative to room. Pleasant and cooperative. Pt does not verbalize any complaints about sleep or appetite. Hygiene encouraged.     Problem: Drug Abuse/Detox  Goal: Will have no detox symptoms and will verbalize plan for changing drug-related behavior  Description: INTERVENTIONS:  1. Administer medication as ordered  2. Monitor physical status  3. Provide emotional support with 1:1 interaction with staff  4. Encourage  recovery focused treatment   8/8/2024 2130 by Lawrence Rosenbaum, RN  Outcome: Progressing    Pt does not verbalize any complaints of withdrawal signs or symptoms. Will monitor throughout this shift.

## 2024-08-09 NOTE — CARE COORDINATION
Per patient request, Social Work sent [facility] Recovery [facility] referral via secure email at this date and time.    Social Work received call back stating that patient is not eligible for their recovery housing program until his medicaid is in place.

## 2024-08-09 NOTE — PROGRESS NOTES
BEHAVIORAL HEALTH FOLLOW-UP NOTE     8/9/2024     Patient was seen and examined in person, Chart reviewed   Patient's case discussed with staff/team    Chief Complaint: Depression with suicidal ideation     Interim History:     Patient was seen at bedside today. Vitally stable. Patient taking medication as prescribed. Labs from 8/7 show low (4.29) RBC, low (13.1) hemoglobin, and low (40.3) hematocrit.     Vitals:    08/09/24 0800   BP: 111/71   Pulse: 92   Resp: 14   Temp: 97.5 °F (36.4 °C)   SpO2:      Patient is alert and oriented to person, place, time, and reason for admission. He was attending group. He says he feels \"great\" today and rates his depressive symptoms a \"1/10.\" He says he slept well last night and has been eating well. He denies auditory hallucinations and visual hallucinations. He denies suicidal ideation. He says he is \"not sure what happened the other day, but I think it might be the drugs.\" The nurse mentioned that he apologized to her earlier in the day for his agitation a few days ago. He says he has court today and needs to \"figure that out.\"He says he is interested in rehab and wants to talk to the  about discharge options. He is smiling and his affect is less flat today.     HPI:  Norbert Bravo is a 33 y.o. male who has a past medical history of depression and polysubstance abuse who presents to the ER with increased depression and suicidal ideation with a plan to jump off a bridge.  Norbert was last admitted to this facility back in 2014. He was discharged on Celexa at that time. He is actively withdrawing from opioids however COWS protocol was initiated. He is restless, sweating, with dilated pupils. He reports having a very poor support system. He reports that he lost both of his parents at a very young age. He reports his mom passed away when he was 14 and shortly after that his father was diagnosed with dementia and throughout his highschool years he took care of his

## 2024-08-09 NOTE — GROUP NOTE
Group Therapy Note    Date: 8/9/2024    Group Start Time: 1045  Group End Time: 1130  Group Topic: Psychoeducation    [facility] Heidy Garcia CTRS    Group Therapy Note    Attendees: 5/20     Patient's Goal:  Patient will identify benefits of creative expression and music for coping and stress management    Notes:  Patient attended group and participated    Status After Intervention:  Improved    Participation Level: Active Listener and Interactive    Participation Quality: Appropriate, Attentive, and Sharing      Speech:  normal      Thought Process/Content: Logical      Affective Functioning: Constricted/Restricted      Mood: euthymic      Level of consciousness:  Alert, Oriented x4, and Attentive      Response to Learning: Able to verbalize current knowledge/experience, Able to verbalize/acknowledge new learning, and Able to change behavior      Endings: None Reported    Modes of Intervention: Education, Support, Socialization, and Exploration      Discipline Responsible: Psychoeducational Specialist      Signature:  MATILDE Godoy

## 2024-08-09 NOTE — GROUP NOTE
Group Therapy Note    Date: 8/9/2024    Group Start Time: 0900  Group End Time: 0930  Group Topic: Group Documentation    [facility] Palma Rodrigues LPN        Group Therapy Note    Attendees: 4/20       Patient's Goal:  inspire    Notes:  educate    Status After Intervention:  Improved    Participation Level: Interactive    Participation Quality: Appropriate      Speech:  normal      Thought Process/Content: Logical      Affective Functioning: Flat      Mood: depressed      Level of consciousness:  Alert      Response to Learning: Progressing to goal      Endings: None Reported    Modes of Intervention: Education      Discipline Responsible: Licensed Practical Nurse      Signature:  Palma Malloy LPN

## 2024-08-09 NOTE — GROUP NOTE
Group Therapy Note    Date: 8/9/2024    Group Start Time: 1330  Group End Time: 1415  Group Topic: Psychoeducation    [facility]    Heidy Parnell CTRS    Group Therapy Note    Attendees: 8/20     Patient's Goal:  Patient will identify benefits of leisure for coping    Notes:  Patient attended group and participated    Status After Intervention:  Unchanged    Participation Level: Active Listener    Participation Quality: Appropriate and Sharing      Speech:  normal      Thought Process/Content: Logical      Affective Functioning: Constricted/Restricted      Mood: euthymic      Level of consciousness:  Alert, Oriented x4, and Attentive      Response to Learning: Able to verbalize current knowledge/experience, Able to verbalize/acknowledge new learning, Able to change behavior, and Progressing to goal      Endings: None Reported    Modes of Intervention: Education, Support, Socialization, and Exploration      Discipline Responsible: Psychoeducational Specialist      Signature:  MATILDE Godoy

## 2024-08-10 PROCEDURE — 99232 SBSQ HOSP IP/OBS MODERATE 35: CPT

## 2024-08-10 PROCEDURE — 6370000000 HC RX 637 (ALT 250 FOR IP): Performed by: PSYCHIATRY & NEUROLOGY

## 2024-08-10 PROCEDURE — 1240000000 HC EMOTIONAL WELLNESS R&B

## 2024-08-10 PROCEDURE — 6360000002 HC RX W HCPCS

## 2024-08-10 RX ADMIN — BUPRENORPHINE AND NALOXONE 2 FILM: 2; .5 FILM BUCCAL; SUBLINGUAL at 13:04

## 2024-08-10 RX ADMIN — PALIPERIDONE 3 MG: 3 TABLET, EXTENDED RELEASE ORAL at 08:11

## 2024-08-10 ASSESSMENT — PAIN SCALES - GENERAL: PAINLEVEL_OUTOF10: 0

## 2024-08-10 NOTE — GROUP NOTE
Group Therapy Note    Date: 8/9/2024    Group Start Time: 2000  Group End Time: 2020  Group Topic: Recreational    [facility] ANKUR Mena, Cuong Hernandez RN        Group Therapy Note    Attendees: 5    patient refused to attend recreational  group at 8 PM  after encouragement from staff.  1:1 talk time was offered but declined as alternative to group session          Signature:  Cuong Mena RN

## 2024-08-10 NOTE — PROGRESS NOTES
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  Norbert was found resting in his room.  He is alert noted x 4 thought processes are logical and linear.  He was pleasant and cooperative.  He notes an improvement suicidal thoughts and denies any homicidal thoughts.  He states overall his mood has improved.  He denies any anxiety.  Has been taking his medications as prescribed and notes them to be beneficial so far.  Denies any auditory hallucinations and states he only experiences this when under the influence of methamphetamines.  States that her perceptual disturbances have resolved since sobering up.  Denies any concerns with sleep or appetite.  He has been attending groups and been appropriate.  Overall insight is improving.  We will continue to monitor patient's progress.  Discharge planning per attending.  No changes to meds at this time        Appetite:  [x] Adequate/Unchanged  [] Increased  [] Decreased      Sleep:       [x] Adequate/Unchanged  [] Fair  [] Poor      Group Attendance on Unit:   [x] Yes   [] Selectively    [] No    Compliant with scheduled medications: [x] Yes  [] No    Received emergency medications in past 24 hrs: [] Yes   [x] No    Medication Side Effects: Denies         Mental Status Exam  Level of consciousness: Alert and awake   Appearance: Appropriate attire for setting, seated on bed, with good  grooming and hygiene   Behavior/Motor: Approachable, engages with interviewer, no psychomotor abnormalities   Attitude toward examiner: Cooperative, attentive, good eye contact  Speech: spontaneous, normal rate, and normal volume   Mood: \"A little better\"  Affect: Congruent with mood  Thought processes: linear, goal directed, and coherent   Thought content:  Denies homicidal ideation  Suicidal Ideation: Reports  improvement in suicidal ideations, contracts for safety on the unit.   Delusions: No evidence of delusions.   Perceptual Disturbance: Denies AVH.  Patient does not appear to be responding to internal stimuli.   Cognition: Oriented to self, location, time, and situation  Memory: intact  Insight: Improving  Judgement: fair       Data   height is 1.727 m (5' 8\") and weight is 66.4 kg (146 lb 6.4 oz). His temperature is 97.6 °F (36.4 °C). His blood pressure is 115/73 and his pulse is 86. His respiration is 14 and oxygen saturation is 99%.   Labs:   Admission on 08/06/2024   Component Date Value Ref Range Status    WBC 08/07/2024 6.7  3.5 - 11.0 k/uL Final    RBC 08/07/2024 4.29 (L)  4.5 - 5.9 m/uL Final    Hemoglobin 08/07/2024 13.1 (L)  13.5 - 17.5 g/dL Final    Hematocrit 08/07/2024 40.3 (L)  41 - 53 % Final    MCV 08/07/2024 93.8  80 - 100 fL Final    MCH 08/07/2024 30.6  26 - 34 pg Final    MCHC 08/07/2024 32.6  31 - 37 g/dL Final    RDW 08/07/2024 14.2  11.5 - 14.9 % Final    Platelets 08/07/2024 202  150 - 450 k/uL Final    MPV 08/07/2024 8.1  6.0 - 12.0 fL Final    Neutrophils % 08/07/2024 57  36 - 66 % Final    Lymphocytes % 08/07/2024 28  24 - 44 % Final    Monocytes % 08/07/2024 11 (H)  1 - 7 % Final    Eosinophils % 08/07/2024 3  0 - 4 % Final    Basophils % 08/07/2024 1  0 - 2 % Final    Neutrophils Absolute 08/07/2024 3.90  1.3 - 9.1 k/uL Final    Lymphocytes Absolute 08/07/2024 1.80  1.0 - 4.8 k/uL Final    Monocytes Absolute 08/07/2024 0.70  0.1 - 1.3 k/uL Final    Eosinophils Absolute 08/07/2024 0.20  0.0 - 0.4 k/uL Final    Basophils Absolute 08/07/2024 0.00  0.0 - 0.2 k/uL Final         Reviewed patient's current plan of care and vital signs with nursing staff.    Labs reviewed: [x] Yes  Vitals reviewed: [x] Yes      Medications  Current Facility-Administered Medications: acetaminophen (TYLENOL) tablet 650 mg, 650 mg, Oral, Q4H PRN  polyethylene glycol (GLYCOLAX) packet 17 g, 17 g, Oral, Daily

## 2024-08-10 NOTE — GROUP NOTE
Group Therapy Note    Date: 8/10/2024    Group Start Time: 0900  Group End Time: 0935  Group Topic: Group Documentation    [facility] Palma Rodrigues LPN        Group Therapy Note    Attendees: 4/19         Patient's Goal:  inspire    Notes:  educate    Status After Intervention:  Improved    Participation Level: Interactive    Participation Quality: Appropriate      Speech:  normal      Thought Process/Content: Logical      Affective Functioning: Flat      Mood: anxious      Level of consciousness:  Alert      Response to Learning: Progressing to goal      Endings: None Reported    Modes of Intervention: Education      Discipline Responsible: Licensed Practical Nurse      Signature:  Palma Malloy LPN

## 2024-08-10 NOTE — PLAN OF CARE
Problem: Pain  Goal: Verbalizes/displays adequate comfort level or baseline comfort level  Outcome: Progressing     Problem: Self Harm/Suicidality  Goal: Will have no self-injury during hospital stay  Description: INTERVENTIONS:  1.  Ensure constant observer at bedside with Q15M safety checks  2.  Maintain a safe environment  3.  Secure patient belongings  4.  Ensure family/visitors adhere to safety recommendations  5.  Ensure safety tray has been added to patient's diet order  6.  Every shift and PRN: Re-assess suicidal risk via Frequent Screener    8/9/2024 2145 by Laura Morataya LPN  Outcome: Progressing     Problem: Depression  Goal: Will be euthymic at discharge  Description: INTERVENTIONS:  1. Administer medication as ordered  2. Provide emotional support via 1:1 interaction with staff  3. Encourage involvement in milieu/groups/activities  4. Monitor for social isolation  8/9/2024 2145 by Laura Morataya LPN  Outcome: Progressing     Problem: Anxiety  Goal: Will report anxiety at manageable levels  Description: INTERVENTIONS:  1. Administer medication as ordered  2. Teach and rehearse alternative coping skills  3. Provide emotional support with 1:1 interaction with staff  Outcome: Progressing     Problem: Drug Abuse/Detox  Goal: Will have no detox symptoms and will verbalize plan for changing drug-related behavior  Description: INTERVENTIONS:  1. Administer medication as ordered  2. Monitor physical status  3. Provide emotional support with 1:1 interaction with staff  4. Encourage  recovery focused treatment   Outcome: Progressing     Problem: Nutrition Deficit:  Goal: Optimize nutritional status  Outcome: Progressing   Patient observed in milieu watching television amongst peers. Patient cooperative with assessment. Patient reports eating and sleeping ok and detox symptoms are subsiding. Patient denies an increase in depression or anxiety at this time. Patient denies any pain at this time and  educated on as needed pain medication. Patient remains free from self harm this shift. Patient denies wanting to cause harm to self or others at this time. Patient encouraged to seek nursing staff at anytime if he felt at danger to themselves or others. Patient states understanding. Safety checks maintained [id number]

## 2024-08-10 NOTE — GROUP NOTE
Group Therapy Note    Date: 8/10/2024    Group Start Time: 1000  Group End Time: 1100  Group Topic: Psychotherapy    [facility]    Bekah Manzano MSW, [place]        Group Therapy Note    Attendees:8/21       Patient's Goal:  Increase interpersonal relationship skills while discussing topics using positive “self talk” topic cards.    Notes:  Patient left group early.    Status After Intervention:  Unchanged    Participation Level: Active Listener    Participation Quality: Appropriate      Speech:  normal      Thought Process/Content: Logical      Affective Functioning: Congruent      Mood: euthymic      Level of consciousness:  Alert      Response to Learning: Able to verbalize current knowledge/experience      Endings: None Reported    Modes of Intervention: Support, Socialization, and Exploration      Discipline Responsible: /Counselor      Signature:  YANETH Horton, [place]

## 2024-08-10 NOTE — PLAN OF CARE
Problem: Depression  Goal: Will be euthymic at discharge  Description: INTERVENTIONS:  1. Administer medication as ordered  2. Provide emotional support via 1:1 interaction with staff  3. Encourage involvement in milieu/groups/activities  4. Monitor for social isolation  Outcome: Progressing     Problem: Anxiety  Goal: Will report anxiety at manageable levels  Description: INTERVENTIONS:  1. Administer medication as ordered  2. Teach and rehearse alternative coping skills  3. Provide emotional support with 1:1 interaction with staff  Outcome: Progressing     Problem: Anxiety  Goal: Will report anxiety at manageable levels  Description: INTERVENTIONS:  1. Administer medication as ordered  2. Teach and rehearse alternative coping skills  3. Provide emotional support with 1:1 interaction with staff  Outcome: Progressing     Problem: Drug Abuse/Detox  Goal: Will have no detox symptoms and will verbalize plan for changing drug-related behavior  Description: INTERVENTIONS:  1. Administer medication as ordered  2. Monitor physical status  3. Provide emotional support with 1:1 interaction with staff  4. Encourage  recovery focused treatment   Outcome: Progressing     Problem: Pain  Goal: Verbalizes/displays adequate comfort level or baseline comfort level  Note: Patient denies any discomfort and is aware of his medication regime.     Problem: Self Harm/Suicidality  Goal: Will have no self-injury during hospital stay  Description: INTERVENTIONS:  1.  Ensure constant observer at bedside with Q15M safety checks  2.  Maintain a safe environment  3.  Secure patient belongings  4.  Ensure family/visitors adhere to safety recommendations  5.  Ensure safety tray has been added to patient's diet order  6.  Every shift and PRN: Re-assess suicidal risk via Frequent Screener    Note: Patient denies any suicidal/homicidal ideations, he is compliant with hospital policy and exhibits no behaviors. Patient is selectively social but  primarily seclusive to self. Programming encouraged and effective.

## 2024-08-11 PROCEDURE — 99232 SBSQ HOSP IP/OBS MODERATE 35: CPT

## 2024-08-11 PROCEDURE — 6360000002 HC RX W HCPCS

## 2024-08-11 PROCEDURE — 6370000000 HC RX 637 (ALT 250 FOR IP): Performed by: PSYCHIATRY & NEUROLOGY

## 2024-08-11 PROCEDURE — 1240000000 HC EMOTIONAL WELLNESS R&B

## 2024-08-11 RX ADMIN — PALIPERIDONE 3 MG: 3 TABLET, EXTENDED RELEASE ORAL at 08:43

## 2024-08-11 RX ADMIN — BUPRENORPHINE AND NALOXONE 2 FILM: 2; .5 FILM BUCCAL; SUBLINGUAL at 12:38

## 2024-08-11 ASSESSMENT — PAIN SCALES - GENERAL: PAINLEVEL_OUTOF10: 1

## 2024-08-11 NOTE — CARE COORDINATION
SOCIAL SERVICE NOTE:  was called to speak with patient about a court date.  speaks with patient and he said that court was notified on August 9th that he was un the hospital but he reports that his friend told him that the court notified him that he has a scheduled court date on Monday 8/11/2024.  let him know that court dates are usually not rescheduled that quickly.  encourages patient to give the court a call in the morning to inform them that he is still here.    Patient then stated that the doctor told him that he can be discharged tomorrow and that he plans on going to a treatment Center.  let franny know that we don't have any information anywhere that he was accepted to a treatment Center.  let him know that a checklist was forwarded to [facility] for him per his request, but they called back and stated they were unable to take him at this time, because he has Pending Medicaid. Patient then said he called [facility] detox program today and they said he could go there to a walk- in appointment before 12:00pm.  let patient know that Social work was unaware of that and that usually there needs to be a notice to have a plan to get patient discharged and there is not a plan in place currently.

## 2024-08-11 NOTE — PLAN OF CARE
Patient interviewed in milieu. Brightened, denies suicidal/homicidal ideations, denies hallucinations, denies any physical concerns. Out and social. No withdrawal symptoms noted. Will continue to monitor for safety and changes in mental status while admitted.    Problem: Pain  Goal: Verbalizes/displays adequate comfort level or baseline comfort level  8/10/2024 2105 by Antonio Hernandes, RN  Outcome: Progressing     Problem: Self Harm/Suicidality  Goal: Will have no self-injury during hospital stay  Description: INTERVENTIONS:  1.  Ensure constant observer at bedside with Q15M safety checks  2.  Maintain a safe environment  3.  Secure patient belongings  4.  Ensure family/visitors adhere to safety recommendations  5.  Ensure safety tray has been added to patient's diet order  6.  Every shift and PRN: Re-assess suicidal risk via Frequent Screener    8/10/2024 2105 by Antonio Hernandes, RN  Outcome: Progressing     Problem: Depression  Goal: Will be euthymic at discharge  Description: INTERVENTIONS:  1. Administer medication as ordered  2. Provide emotional support via 1:1 interaction with staff  3. Encourage involvement in milieu/groups/activities  4. Monitor for social isolation  8/10/2024 2105 by Antonio Hernandes, RN  Outcome: Progressing     Problem: Anxiety  Goal: Will report anxiety at manageable levels  Description: INTERVENTIONS:  1. Administer medication as ordered  2. Teach and rehearse alternative coping skills  3. Provide emotional support with 1:1 interaction with staff  8/10/2024 2105 by Antonio Hernandes, RN  Outcome: Progressing     Problem: Drug Abuse/Detox  Goal: Will have no detox symptoms and will verbalize plan for changing drug-related behavior  Description: INTERVENTIONS:  1. Administer medication as ordered  2. Monitor physical status  3. Provide emotional support with 1:1 interaction with staff  4. Encourage  recovery focused treatment   8/10/2024 2105 by Antonio Hernandes, RN  Outcome:

## 2024-08-11 NOTE — PROGRESS NOTES
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  Norbert was found resting in his room.  He continues to be pleasant and approachable.  Affect has appears euthymic.  Thought processes are logical and linear.  He is noting ongoing improvement in mood and notes an improvement suicidal thoughts.  He denies any homicidal thoughts.  He denies any paranoia, delusions, or hallucinations.  He states he has not experienced any hallucinations since sobering up from methamphetamines.  He does express a desire to go to rehab and has been looking into [facility] and [facility].  Plans on making calls tomorrow.  Denies any problems with sleep or appetite.  He has been attending groups and behaving appropriately.  Notes his medications to be beneficial and does not feel he needs any changes at this time.  Overall doing much better.  Social work assisting with rehab placement.    Appetite:  [x] Adequate/Unchanged  [] Increased  [] Decreased      Sleep:       [x] Adequate/Unchanged  [] Fair  [] Poor      Group Attendance on Unit:   [x] Yes   [] Selectively    [] No    Compliant with scheduled medications: [x] Yes  [] No    Received emergency medications in past 24 hrs: [] Yes   [x] No    Medication Side Effects: Denies         Mental Status Exam  Level of consciousness: Alert and awake   Appearance: Appropriate attire for setting, seated in chair, with fair  grooming and hygiene   Behavior/Motor: Approachable, engages with interviewer, no psychomotor abnormalities   Attitude toward examiner: Cooperative, attentive, good eye contact  Speech: spontaneous, normal rate, and normal volume   Mood: \"Doing good\"  Affect: Congruent with mood  Thought processes: linear, goal directed, and coherent   Thought content:  Denies homicidal ideation  Suicidal Ideation: Reports  improvement in suicidal ideations, contracts for safety on the unit.   Delusions: No evidence of delusions.   Perceptual Disturbance: Denies AVH.  Patient does not appear to be responding to internal stimuli.   Cognition: Oriented to self, location, time, and situation  Memory: intact  Insight: Improving  Judgement: fair       Data   height is 1.727 m (5' 8\") and weight is 72.6 kg (160 lb 1.1 oz). His temperature is 98.1 °F (36.7 °C). His blood pressure is 122/81 and his pulse is 93. His respiration is 16 and oxygen saturation is 98%.   Labs:   Admission on 08/06/2024   Component Date Value Ref Range Status    WBC 08/07/2024 6.7  3.5 - 11.0 k/uL Final    RBC 08/07/2024 4.29 (L)  4.5 - 5.9 m/uL Final    Hemoglobin 08/07/2024 13.1 (L)  13.5 - 17.5 g/dL Final    Hematocrit 08/07/2024 40.3 (L)  41 - 53 % Final    MCV 08/07/2024 93.8  80 - 100 fL Final    MCH 08/07/2024 30.6  26 - 34 pg Final    MCHC 08/07/2024 32.6  31 - 37 g/dL Final    RDW 08/07/2024 14.2  11.5 - 14.9 % Final    Platelets 08/07/2024 202  150 - 450 k/uL Final    MPV 08/07/2024 8.1  6.0 - 12.0 fL Final    Neutrophils % 08/07/2024 57  36 - 66 % Final    Lymphocytes % 08/07/2024 28  24 - 44 % Final    Monocytes % 08/07/2024 11 (H)  1 - 7 % Final    Eosinophils % 08/07/2024 3  0 - 4 % Final    Basophils % 08/07/2024 1  0 - 2 % Final    Neutrophils Absolute 08/07/2024 3.90  1.3 - 9.1 k/uL Final    Lymphocytes Absolute 08/07/2024 1.80  1.0 - 4.8 k/uL Final    Monocytes Absolute 08/07/2024 0.70  0.1 - 1.3 k/uL Final    Eosinophils Absolute 08/07/2024 0.20  0.0 - 0.4 k/uL Final    Basophils Absolute 08/07/2024 0.00  0.0 - 0.2 k/uL Final         Reviewed patient's current plan of care and vital signs with nursing staff.    Labs reviewed: [x] Yes  Vitals reviewed: [x] Yes      Medications  Current Facility-Administered Medications: acetaminophen (TYLENOL) tablet 650 mg, 650 mg, Oral, Q4H PRN  polyethylene glycol (GLYCOLAX) packet 17 g, 17 g, Oral, Daily  PRN  hydrOXYzine HCl (ATARAX) tablet 50 mg, 50 mg, Oral, TID PRN  traZODone (DESYREL) tablet 50 mg, 50 mg, Oral, Nightly PRN  aluminum & magnesium hydroxide-simethicone (MAALOX) 200-200-20 MG/5ML suspension 30 mL, 30 mL, Oral, Q6H PRN  cyclobenzaprine (FLEXERIL) tablet 10 mg, 10 mg, Oral, TID PRN  ondansetron (ZOFRAN) tablet 4 mg, 4 mg, Oral, TID PRN  dicyclomine (BENTYL) tablet 20 mg, 20 mg, Oral, 4x Daily PRN  cloNIDine (CATAPRES) tablet 0.1 mg, 0.1 mg, Oral, Q4H PRN  buprenorphine-naloxone (SUBOXONE) 2-0.5 MG SL film 2 Film, 2 Film, SubLINGual, PRN  haloperidol lactate (HALDOL) injection 5 mg, 5 mg, IntraMUSCular, Q6H PRN **AND** [DISCONTINUED] LORazepam (ATIVAN) injection 2 mg, 2 mg, IntraMUSCular, Q6H PRN **AND** diphenhydrAMINE (BENADRYL) injection 50 mg, 50 mg, IntraMUSCular, Q6H PRN  haloperidol (HALDOL) tablet 5 mg, 5 mg, Oral, Q6H PRN **AND** [DISCONTINUED] LORazepam (ATIVAN) tablet 2 mg, 2 mg, Oral, Q6H PRN  paliperidone (INVEGA) extended release tablet 3 mg, 3 mg, Oral, Daily    ASSESSMENT  Major depressive disorder, recurrent, severe with psychotic features (HCC)         PLAN  Patient symptoms are: Showing modest improvement  No changes to medications  Patient is interested in going to rehab either at [facility] or [facility]  Monitor need and frequency of PRN medications.  Encourage participation in groups and milieu.    Follow-up daily while inpatient.     Patient continues to be monitored in the inpatient psychiatric facility at [facility] for safety and stabilization. Patient continues to need, on a daily basis, active treatment furnished directly by or requiring the supervision of inpatient psychiatric personnel.    Electronically signed by DILCIA Garcia CNP on 8/11/2024 at 3:17 PM    **This report has been created using voice recognition software. It may contain minor errors which are inherent in voice recognition technology.**

## 2024-08-11 NOTE — GROUP NOTE
Group Therapy Note    Date: 8/11/2024    Group Start Time: 1000  Group End Time: 1020  Group Topic: Psychoeducation    Masood Joseph        Group Therapy Note    Attendees: 2/19     Patient was offered group therapy today but declined to participate despite encouragement from staff. 1:1 was offered.      Signature:  Masood Hall

## 2024-08-11 NOTE — DISCHARGE INSTRUCTIONS
General Information:   Questions regarding your bill: Call HELP program [phone]     Suicide Hotline ([facility] Crisis Care Line)  (297) 129-6822      Recovery Help line- 735.948.9324      To obtain results of pending studies call Medical Records at: [phone]     For emergencies and 24 hour/7 days a week contact information:  [phone]     Learning About Making a Suicide Safety Plan  Overview     A safety plan is a set of steps you can take when you feel suicidal. It includes your warning signs, coping strategies, and people to ask for support. You can write your own safety plan or use a free phone trace. But it's best to work with a therapist to make your plan.  How can you make and use your plan?  If you feel like you can't keep from hurting yourself or someone else, get help right away. Call 911.  Where to get help 24 hours a day, 7 days a week   If you or someone you know talks about suicide, self-harm, a mental health crisis, a substance use crisis, or any other kind of emotional distress, get help right away. You can:  Call the Suicide and Crisis Lifeline at 374.  Call 6-703-536-TALK (1-736.785.7972).  Text HOME to 172047 to access the Crisis Text Line.  Consider saving these numbers in your phone.  Go to [employer] for more information or to chat online.  Having a safety plan is important for anyone who thinks about suicide. It can help you (or someone you care about) get safely through times of crisis. If possible, try to make your plan during a time when you aren't in a crisis so it's ready when you need it.  To use the plan, move through it step-by-step. So first, check your warning signs. Then try your own coping strategies. If those don't help, move through the rest of the steps until you have the help you need to get through the crisis.  Here's how to make a safety plan.  Make a list of your crisis warning signs.   What happens when you start to think about suicide? Make a list of  your warning signs--the things you think, feel, or do when you start to feel suicidal.  List your personal coping strategies.   What can you do or think about to avoid acting when you feel suicidal? This may include your reason(s) to live. Rank these ideas by how well you think they'll work. What might keep you from using them? What might make you more likely to use them?  Come up with some sources of support and distraction.   Think of people and places that could help shift your attention away from painful feelings or thoughts of dying. This may include children you care about or a safe social space, like a coffee shop or a bookstore.  Make a list of people you can count on for help.   Think about who you could contact in a crisis. Who do you trust and confide in? Who is always there when you need them? This might be a friend, a family member, or someone else, like a caregiver or . If no one comes to mind, that's okay. Be sure you have some professional support, such as a doctor or counselor.  List your professional sources of support.   This may include your doctor or therapist, local emergency rooms, and local crisis hotlines.  Think through ways to keep yourself safe.   Do you have weapons or other means to hurt yourself? Consider how to limit your access to them. For example, if you have a gun, maybe you could ask a friend or family member to lock it up for you.  When should you call for help?   Call 911 anytime you think you may need emergency care. For example, call if:    You feel you cannot stop from hurting yourself or someone else.   Where to get help 24 hours a day, 7 days a week   If you or someone you know talks about suicide, self-harm, a mental health crisis, a substance use crisis, or any other kind of emotional distress, get help right away. You can:    Call the Suicide and Crisis Lifeline at 841.     Call 9-315-925-TALK (1-380.142.6290).     Text HOME to 204856 to access the Crisis Text  Line.   Consider saving these numbers in your phone.  Go to [email or web address] for more information or to chat online.  Call your doctor now or seek immediate medical care if:    You have one or more warning signs of suicide. For example, call if:  You feel like giving away your possessions.  You use illegal drugs or drink alcohol heavily.  You talk or write about death. This may include writing suicide notes and talking about guns, knives, or pills.  You start to spend a lot of time alone or spend more time alone than usual.     You hear voices.     You start acting in an aggressive way that's not normal for you.   Watch closely for changes in your health, and be sure to contact your doctor if you have any problems.  Current as of: June 24, 2023  Content Version: 14.1  © 2006-2024 [employer].   Care instructions adapted under license by [employer]. If you have questions about a medical condition or this instruction, always ask your healthcare professional. [employer] disclaims any warranty or liability for your use of this information.       Learning About Being High-Risk for Serious Illness From COVID-19  What puts you at high risk for serious illness?  COVID-19 causes a mild illness in many people who get it. But certain things may increase your risk for more serious illness. These include:  Age.  Older adults are at highest risk. The risk increases with age.  Babies born premature or who are less than 6 months old may also be at high risk.  Asthma, cystic fibrosis, chronic obstructive pulmonary disease (COPD), and other chronic lung diseases.  A weakened immune system or taking medicines, such as steroids, that suppress the immune system. This also includes medicines taken because of an organ transplant.  Smoking or having a history of smoking.  Serious heart conditions, such as heart failure, coronary artery disease, or high blood pressure.  Tuberculosis (TB).  HIV.  Cancer or getting

## 2024-08-11 NOTE — PLAN OF CARE
Problem: Pain  Goal: Verbalizes/displays adequate comfort level or baseline comfort level  Outcome: Progressing     Problem: Depression  Goal: Will be euthymic at discharge  Description: INTERVENTIONS:  1. Administer medication as ordered  2. Provide emotional support via 1:1 interaction with staff  3. Encourage involvement in milieu/groups/activities  4. Monitor for social isolation  Outcome: Progressing     Problem: Self Harm/Suicidality  Goal: Will have no self-injury during hospital stay  Description: INTERVENTIONS:  1.  Ensure constant observer at bedside with Q15M safety checks  2.  Maintain a safe environment  3.  Secure patient belongings  4.  Ensure family/visitors adhere to safety recommendations  5.  Ensure safety tray has been added to patient's diet order  6.  Every shift and PRN: Re-assess suicidal risk via Frequent Screener    Note: Patient denies any ideations to inflict self harm or harm others, patient expresses feeling anxious with mild depression. Patient is motivated and attempting to incorporate coping skills to assist him in managing his anxiety and depression. Patient has been friendly and cooperative with no behaviors as staff will continue to closely monitor.

## 2024-08-12 VITALS
HEIGHT: 68 IN | SYSTOLIC BLOOD PRESSURE: 106 MMHG | RESPIRATION RATE: 15 BRPM | TEMPERATURE: 97.5 F | BODY MASS INDEX: 24.26 KG/M2 | DIASTOLIC BLOOD PRESSURE: 68 MMHG | OXYGEN SATURATION: 100 % | WEIGHT: 160.07 LBS | HEART RATE: 93 BPM

## 2024-08-12 PROCEDURE — 99239 HOSP IP/OBS DSCHRG MGMT >30: CPT | Performed by: PSYCHIATRY & NEUROLOGY

## 2024-08-12 PROCEDURE — 99231 SBSQ HOSP IP/OBS SF/LOW 25: CPT | Performed by: INTERNAL MEDICINE

## 2024-08-12 PROCEDURE — 6370000000 HC RX 637 (ALT 250 FOR IP): Performed by: NURSE PRACTITIONER

## 2024-08-12 PROCEDURE — 6370000000 HC RX 637 (ALT 250 FOR IP): Performed by: PSYCHIATRY & NEUROLOGY

## 2024-08-12 PROCEDURE — 6360000002 HC RX W HCPCS

## 2024-08-12 RX ORDER — PALIPERIDONE 3 MG/1
3 TABLET, EXTENDED RELEASE ORAL DAILY
Qty: 30 TABLET | Refills: 3 | Status: ON HOLD | OUTPATIENT
Start: 2024-08-13 | End: 2024-08-28 | Stop reason: HOSPADM

## 2024-08-12 RX ADMIN — PALIPERIDONE 3 MG: 3 TABLET, EXTENDED RELEASE ORAL at 08:19

## 2024-08-12 RX ADMIN — HYDROXYZINE HYDROCHLORIDE 50 MG: 50 TABLET, FILM COATED ORAL at 10:47

## 2024-08-12 RX ADMIN — BUPRENORPHINE AND NALOXONE 2 FILM: 2; .5 FILM BUCCAL; SUBLINGUAL at 11:54

## 2024-08-12 NOTE — GROUP NOTE
Group Therapy Note     Date: 8/12/2024     Group Start Time: 0900  Group End Time: 0930  Group Topic: Group Documentation     Alexandra Gutierrez           Group Therapy Note     Attendees: 6/21        Goal  Group Note           Date: 8/12/2024          Start Time: 0900                    End Time: 0930        Number of Participants in Group & Unit Census:  6/21     Topic: goal setting     Goal of Group:Morning goal group session, patient has opportunity to reflect on what they need to accomplish to achieve discharge, and decide on a step towards that that they would like to accomplish by the end of the day today.         Comments:      Patient did not participate in  Goal  group, despite staff encouragement and explanation of benefits.  Patient remain seclusive to self.  Q15 minute safety checks maintained for patient safety and will continue to encourage patient to attend unit programming.     Modes of Intervention: Support and Socialization        Discipline Responsible: Behavorial Health Tech        Signature:  Alexandra Flores

## 2024-08-12 NOTE — TRANSITION OF CARE
Behavioral Health Transition Record    Patient Name: Norbert Bravo  YOB: 1990   Medical Record Number: [id number]  Date of Admission: 8/6/2024  9:22 AM   Date of Discharge: 8/12/2024      Attending Provider: Boby Mueller MD   Discharging Provider: Ervin   To contact this individual call   and ask the  to page.  If unavailable, ask to be transferred to Behavioral Health Provider on call.  A Behavioral Health Provider will be available on call 24/7 and during holidays.    Primary Care Provider: No primary care provider on file.    No Known Allergies    Reason for Admission: Patient voluntary from [facility] Presented with suicidal thoughts to jump out of a window or off of high [place] bridge due to increased drug use and being kicked out of his friend's house. Patient also reports drinking a small amount of cleaning solution before walking to the high level bridge. Patient reports feeling hopeless and helpess due to his housing and financial situation. Patient reports struggling with physical health after an overdose he had a few months ago. Patient reports having to take care of his parents as a child and increasing depression since they passed, stating he has no family left. Patient also reports losing his only friend as he was the one that called the  on him, resulting in his suicidal ideations     Admission Diagnosis: Depression with suicidal ideation [F32.A, R45.851]    * No surgery found *    Results for orders placed or performed during the hospital encounter of 08/06/24   CBC with Auto Differential   Result Value Ref Range    WBC 6.7 3.5 - 11.0 k/uL    RBC 4.29 (L) 4.5 - 5.9 m/uL    Hemoglobin 13.1 (L) 13.5 - 17.5 g/dL    Hematocrit 40.3 (L) 41 - 53 %    MCV 93.8 80 - 100 fL    MCH 30.6 26 - 34 pg    MCHC 32.6 31 - 37 g/dL    RDW 14.2 11.5 - 14.9 %    Platelets 202 150 - 450 k/uL    MPV 8.1 6.0 - 12.0 fL    Neutrophils % 57 36 - 66 %    Lymphocytes % 28 24 - 44 %     not consume drugs or alcohol while taking medications.  Medications filled at hospital pharmacy and sent home with you.             Unresulted Labs (24h ago, onward)      None            To obtain results of studies pending at discharge, please contact  Medical records, [phone]    Follow-up Information       Follow up With Specialties Details Why Contact Info    [facility] Behavioral Health Go on 8/13/2024 At 2PM- Medication management appointment [address] [facility] [address]  [phone]    [facility] Behavioral Health Go on 8/13/2024 Arrive before 3PM for admission to inpatient substance use program. [address] [facility] [address]  [phone]             Advanced Directive:   Does the patient have an appointed surrogate decision maker? No  Does the patient have a Medical Advance Directive? No  Does the patient have a Psychiatric Advance Directive? No  If the patient does not have a surrogate or Medical Advance Directive AND Psychiatric Advance Directive, the patient was offered information on these advance directives Patient declined to complete    Patient Instructions: Please continue all medications until otherwise directed by physician.      Tobacco Cessation Discharge Plan:   Is the patient a tobacco user  and needs referral for tobacco cessation? Yes  Patient referred to the following for tobacco cessation with an appointment? Patient refused  Patient was offered medication to assist with tobacco cessation at discharge? Patient refused    Alcohol/Substance Abuse Discharge Plan:   Does the patient have a history of substance/alcohol abuse and requires a referral for treatment? Yes  Patient referred to the following for substance/alcohol abuse treatment with an appointment? Yes  Patient was offered medication to assist with substance/alcohol abuse cessation at discharge? Patient refused      Patient discharged to: Home; Transition record discussed with  patient/caregiver and provided this record in hard copy or electronically

## 2024-08-12 NOTE — PROGRESS NOTES
BEHAVIORAL HEALTH FOLLOW-UP NOTE     8/12/2024     Patient was seen and examined in person, Chart reviewed   Patient's case discussed with staff/team    Chief Complaint: Depression with suicidal ideation     Interim History:     Patient was seen at bedside today. Vitally stable. Patient has been receiving Suboxone daily.  Labs from 8/7 show low (4.29) RBC, low (13.1) hemoglobin, and low (40.3) hematocrit.     Vitals:    08/12/24 0800   BP: 106/68   Pulse: 93   Resp: 15   Temp: 97.5 °F (36.4 °C)   SpO2:      Patient is resting in bed. Patient is alert and oriented to person, place, time and reason for admission. He is pleasant and euthymic. He admits to anxiety surrounding his court date, he says he is feeling overwhelmed and does not want another warrant to be issued. He denies depression, suicidal ideation, paranoia, and hallucinations. When asking about these symptoms he says that he is \"pretty sure\" that all of these were previously brought on by the drugs he was taking. He says he does not think he will need to be on the antipsychotic long term when he is no longer on drugs. He says he is doing well, and mentions putting together a monopoly game for the unit. He says he is sleeping and eating well. He says he would like to go home or to [facility] for rehab. He says he is feeling some withdrawal symptoms, but they are manageable.     HPI:  Norbert Bravo is a 33 y.o. male who has a past medical history of depression and polysubstance abuse who presents to the ER with increased depression and suicidal ideation with a plan to jump off a bridge.  Norbert was last admitted to this facility back in 2014. He was discharged on Celexa at that time. He is actively withdrawing from opioids however COWS protocol was initiated. He is restless, sweating, with dilated pupils. He reports having a very poor support system. He reports that he lost both of his parents at a very young age. He reports his mom passed away when he was

## 2024-08-12 NOTE — PLAN OF CARE
Problem: Self Harm/Suicidality  Goal: Will have no self-injury during hospital stay  Description: INTERVENTIONS:  1.  Ensure constant observer at bedside with Q15M safety checks  2.  Maintain a safe environment  3.  Secure patient belongings  4.  Ensure family/visitors adhere to safety recommendations  5.  Ensure safety tray has been added to patient's diet order  6.  Every shift and PRN: Re-assess suicidal risk via Frequent Screener    Outcome: Progressing     Problem: Depression  Goal: Will be euthymic at discharge  Description: INTERVENTIONS:  1. Administer medication as ordered  2. Provide emotional support via 1:1 interaction with staff  3. Encourage involvement in milieu/groups/activities  4. Monitor for social isolation  Outcome: Progressing     Problem: Anxiety  Goal: Will report anxiety at manageable levels  Description: INTERVENTIONS:  1. Administer medication as ordered  2. Teach and rehearse alternative coping skills  3. Provide emotional support with 1:1 interaction with staff  Outcome: Progressing     Patient denies suicidal thoughts, thoughts to self harm, and has remained free from self injury thus far.   Patient has been often observed social in the milieu and selective participant in group activities.   Patient endorses some anxiety related to discharge planning and court but is overall hopeful for discharge soon.  Patient observed maintaining adequate nutritional/fluid intake thus far and participating in hygiene care.  Patient encouraged to seek staff for any needs or concerns that may arise.  Safe environment maintained.  Safety checks in place q15 min per protocol.

## 2024-08-12 NOTE — GROUP NOTE
Group Therapy Note    Date: 8/12/2024    Group Start Time: 1400  Group End Time: 1415  Group Topic: Group Documentation    Alexandra Gutierrez        Group Therapy Note    Attendees: 2/17       Cognitive Skills Group Note        Date: 8/12/2024  Start Time: 1400  End Time: 1415      Number of Participants in Group & Unit Census:  2/17    Topic: cognitive skills, understanding emotions    Goal of Group:patients have opportunity to discuss emotions and healthy ways to handle them      Comments:     Patient did not participate in Cognitive Skills group, despite staff encouragement and explanation of benefits.  Patient remain seclusive to self.  Q15 minute safety checks maintained for patient safety and will continue to encourage patient to attend unit programming.     Modes of Intervention: Education, Exploration, and Activity      Discipline Responsible: Behavorial Health Tech      Signature:  Alexandra Flores

## 2024-08-12 NOTE — PROGRESS NOTES
IN-PATIENT SERVICE  [facility] Internal Medicine  [facility] Internal Medicine   Guru Eldridge MD; Jacob Mcintosh MD; Ankur Espinosa MD; Luis Peacock MD,   Donna Pearson MD; Monet Grider MD; Brooklyn Carreno MD; Ken Falcon MD; Fam Marti MD    HISTORY AND PHYSICAL EXAMINATION/ CONSULT NOTE            Date:   8/12/2024  Patient name:  Norbert Bravo  Date of admission:  8/6/2024  9:22 AM  MRN:   [id number]  Account:  [id number]  YOB: 1990  PCP:    No primary care provider on file.  Room:   [address] [place] [address]  Code Status:    Full Code    Physician Requesting Consult: Boby Mueller MD    Reason for Consult: History and physical, medical management    Chief Complaint:     No chief complaint on file.    Medical management    History Obtained From:     Patient, EMR, nursing staff    History of Present Illness:     33-year-old male admitted for depression with suicidal ideation    No significant medical history  Patient denies any chest pain palpitation cough difficulty breathing nausea vomiting diarrhea or urinary symptoms      Past Medical History:     Past Medical History:   Diagnosis Date    Depression     Suicidal thoughts         Past Surgical History:     No past surgical history on file.     Medications Prior to Admission:     Prior to Admission medications    Medication Sig Start Date End Date Taking? Authorizing Provider   magnesium hydroxide (MILK OF MAGNESIA) 400 MG/5ML suspension Take 30 mLs by mouth daily as needed for Constipation  Patient not taking: Reported on 8/6/2024 6/21/24   Home Dasilva MD        Allergies:     Patient has no known allergies.    Social History:     Tobacco:    reports that he has been smoking cigarettes. He does not have any smokeless tobacco history on file.  Alcohol:      reports current alcohol use of about 4.2 standard drinks of alcohol per week.  Drug Use:  reports current drug use. Drugs: IV and Marijuana

## 2024-08-12 NOTE — GROUP NOTE
Group Refusal Note:     Patient refused to attend coping skills group even after encouragement from staff.  will continue to monitor and support.

## 2024-08-12 NOTE — PROGRESS NOTES
CLINICAL PHARMACY NOTE: MEDS TO BEDS    Total # of Prescriptions Filled: 1   The following medications were delivered to the patient:  Vouchered 14 day supply due to lack of prescription insurance  Paliperidone ER 3mg    Additional Documentation: 8/12/24 p/u at [facility] by ANKUR Levine 4:16pm ivette

## 2024-08-12 NOTE — BH NOTE
Patient given tobacco quitline number 66504449805 at this time, refusing to call at this time, states \" I just dont want to quit now\"- patient given information as to the dangers of long term tobacco use. Continue to reinforce the importance of tobacco cessation.

## 2024-08-12 NOTE — DISCHARGE INSTR - PHARMACY
Take medications as prescribed.  Do not consume drugs or alcohol while taking medications.  Medications filled at hospital pharmacy and sent home with you.

## 2024-08-12 NOTE — DISCHARGE SUMMARY
DISCHARGE SUMMARY      Patient ID:  Norbert Bravo  [id number]  33 y.o.  1990    Admit date: 8/6/2024    Discharge date and time: 8/12/2024    Disposition: Home     Admitting Physician: Boby Mueller MD     Discharge Physician: Dr ERICKA Mueller MD    Admission Diagnoses: Depression with suicidal ideation [F32.A, R45.851]    Admission Condition: poor    Discharged Condition: stable    Admission Circumstance: Norbert Bravo is a 33 y.o. male who has a past medical history of depression and polysubstance abuse who presents to the ER with increased depression and suicidal ideation with a plan to jump off a bridge.  Norbert was last admitted to this facility back in 2014. He was discharged on Celexa at that time.  Norbert is agreeable to assessment in unit sensory room today.  He is actively withdrawing from opioids however COWS protocol was initiated.  He continues to be very restless, is constantly sniffling his nose, sweating and his pupils are dilated.  When asked about events leading up to hospitalization Norbert reports having a very poor support system.  He reports that he lost both of his parents at a very young age.  He reports his mom passed away when he was 14 and shortly after that his father was diagnosed with dementia and throughout his highschool years he took care of his father until he eventually passed away.  He reports shortly after the death of his father he continued to struggle and started using drugs to self-medicate.  He reports that he was using Heroin reguarly but in 2012 did get clean for about 4-5 years.  He reports that back in 2018 he relapsed and has been using ever since.  He reports he is tired of his drug use and wants to be clean again.  He reports that also last year he went to penitentiary for 6 months and while he was in penitentiary his ex-girlfriend reached out to him and told him she had a baby and believed it to be his.  He states they talked for a while and she was very \"positive\" and made  hopelessness or worthlessness  No active SI/HI  Appetite:  [x] Normal  [] Increased  [] Decreased    Sleep:       [x] Normal  [] Fair       [] Poor            Energy:    [x] Normal  [] Increased  [] Decreased     SI [] Present  [x] Absent  HI  []Present  [x] Absent   Aggression:  [] yes  [] no  Patient is [x] able  [] unable to CONTRACT FOR SAFETY   Medication side effects(SE):  [x] None(Psych. Meds.) [] Other      Mental Status Examination on discharge:    Level of consciousness:  within normal limits   Appearance:  well-appearing  Behavior/Motor:  no abnormalities noted  Attitude toward examiner:  attentive and good eye contact  Speech:  spontaneous, normal rate and normal volume   Mood: euthymic  Affect:  mood congruent  Thought processes:  coherent   Thought content:  Suicidal Ideation:  denies suicidal ideation  Delusions:  no evidence of delusions  Perceptual Disturbance:  denies any perceptual disturbance  Cognition:  oriented to person, place, and time   Concentration intact  Memory intact  Insight good   Judgement fair   Fund of Knowledge adequate      ASSESSMENT:  Patient symptoms are:  [x] Well controlled  [x] Improving  [] Worsening  [] No change      Diagnosis:  Principal Problem:    Major depressive disorder, recurrent, severe with psychotic features (HCC)  Active Problems:    Depression with suicidal ideation  Resolved Problems:    * No resolved hospital problems. *      LABS:    No results for input(s): \"WBC\", \"HGB\", \"PLT\" in the last 72 hours.  No results for input(s): \"NA\", \"K\", \"CL\", \"CO2\", \"BUN\", \"CREATININE\", \"GLUCOSE\" in the last 72 hours.  No results for input(s): \"BILITOT\", \"ALKPHOS\", \"AST\", \"ALT\" in the last 72 hours.  Lab Results   Component Value Date/Time    BARBSCNU NEGATIVE 08/06/2024 03:34 AM    LABBENZ NEGATIVE 08/06/2024 03:34 AM    LABMETH NEGATIVE 08/06/2024 03:34 AM    PPXUR NOT REPORTED 01/04/2014 11:35 AM    ETOH <10 08/06/2024 03:32 AM     No results found for: \"TSH\",

## 2024-08-13 NOTE — BH NOTE
[facility]  Discharge Note    Pt discharged with followings belongings:   Dental Appliances: None  Vision - Corrective Lenses: None  Hearing Aid: None  Jewelry: None  Body Piercings Removed: N/A  Clothing: Belt, Footwear, Shirt, Shorts, Socks, Undergarments  Other Valuables: Lighter/Matches, Other (Comment) (ID)   Valuables sent home with patient or returned to patient. Patient educated on aftercare instructions: completed  Information faxed to [facility] by nurse  at 8:31 PM .Patient verbalize understanding of AVS:  yes.    Status EXAM upon discharge:  Mental Status and Behavioral Exam  Normal: No  Level of Assistance: Independent/Self  Facial Expression: Brightened  Affect: Appropriate  Level of Consciousness: Alert  Frequency of Checks: 4 times per hour, close  Mood:Normal: No  Mood: Anxious  Motor Activity:Normal: Yes  Motor Activity: Decreased  Eye Contact: Fair  Observed Behavior: Friendly, Preoccupied, Cooperative  Sexual Misconduct History: Current - no  Preception: [place] to person, [place] to time, [place] to place, [place] to situation  Attention:Normal: Yes  Attention: Distractible  Thought Processes: Unremarkable  Thought Content:Normal: No  Thought Content: Preoccupations  Depression Symptoms: No problems reported or observed. (patient denies depression symptoms)  Anxiety Symptoms: Generalized  Noemi Symptoms: No problems reported or observed.  Hallucinations: None (patient denies tenderness)  Delusions: No  Memory:Normal: Yes  Memory: Poor recent  Insight and Judgment: No  Insight and Judgment: Poor judgment, Poor insight    Tobacco Screening:  Practical Counseling, on admission, nghia X, if applicable and completed (first 3 are required if patient doesn't refuse):            ( ) Recognizing danger situations (included triggers and roadblocks)                    ( ) Coping skills (new ways to manage stress,relaxation techniques, changing routine, distraction)                                                            ( ) Basic information about quitting (benefits of quitting, techniques in how to quit, available resources  ( ) [facility]                                                                                                                   ( x) Patient refused counseling  ( ) Patient refused referral  ( ) Patient refused prescription upon discharge  ( ) Patient has not smoked in the last 30 days    Metabolic Screening:    No results found for: \"LABA1C\"    No results found for: \"CHOL\"  No results found for: \"TRIG\"  No results found for: \"HDL\"  No components found for: \"LDLCAL\"  No components found for: \"LABVLDL\"    Janae Willett RN

## 2024-08-19 NOTE — CARE COORDINATION
Name: Norbert Bravo    : 1990    Auth number: N/A     Discharge Date: 24    Destination: home     Discharge Medications:      Medication List        START taking these medications      paliperidone 3 MG extended release tablet  Commonly known as: INVEGA  Take 1 tablet by mouth daily  Notes to patient: Mood stabilizer             STOP taking these medications      magnesium hydroxide 400 MG/5ML suspension  Commonly known as: Milk of Magnesia               Where to Get Your Medications        These medications were sent to [id number] - [address] -  [phone] - [phone]  [address] [place] [address]      Phone: [phone]   paliperidone 3 MG extended release tablet     Pharmacy Instructions:    Take medications as prescribed.  Do not consume drugs or alcohol while taking medications.  Medications filled at hospital pharmacy and sent home with you.             Follow Up Appointment: [facility] [place]   [address]  [facility] [address]  [phone]  Go on 2024  At 2PM- Medication management appointment    [facility] [place]   [address]  [facility] [address]  [phone]  Go on 2024  Arrive before 3PM for admission to inpatient substance use program.

## 2024-08-25 ENCOUNTER — HOSPITAL ENCOUNTER (INPATIENT)
Age: 34
LOS: 3 days | Discharge: HOME | DRG: 751 | End: 2024-08-28
Attending: EMERGENCY MEDICINE | Admitting: PSYCHIATRY & NEUROLOGY
Payer: MEDICAID

## 2024-08-25 DIAGNOSIS — F23 ACUTE PSYCHOSIS (HCC): Primary | ICD-10-CM

## 2024-08-25 DIAGNOSIS — F22 PARANOIA (HCC): ICD-10-CM

## 2024-08-25 LAB
ALBUMIN SERPL-MCNC: 4.2 G/DL (ref 3.5–5.2)
ALP SERPL-CCNC: 89 U/L (ref 40–129)
ALT SERPL-CCNC: 17 U/L (ref 5–41)
ANION GAP SERPL CALCULATED.3IONS-SCNC: 13 MMOL/L (ref 9–17)
AST SERPL-CCNC: 20 U/L
BASOPHILS # BLD: 0 K/UL (ref 0–0.2)
BASOPHILS NFR BLD: 0 % (ref 0–2)
BILIRUB SERPL-MCNC: 1.1 MG/DL (ref 0.3–1.2)
BUN SERPL-MCNC: 18 MG/DL (ref 6–20)
CALCIUM SERPL-MCNC: 8.9 MG/DL (ref 8.6–10.4)
CHLORIDE SERPL-SCNC: 106 MMOL/L (ref 98–107)
CO2 SERPL-SCNC: 21 MMOL/L (ref 20–31)
CREAT SERPL-MCNC: 0.8 MG/DL (ref 0.7–1.2)
EOSINOPHIL # BLD: 0 K/UL (ref 0–0.4)
EOSINOPHILS RELATIVE PERCENT: 0 % (ref 0–4)
ERYTHROCYTE [DISTWIDTH] IN BLOOD BY AUTOMATED COUNT: 14.8 % (ref 11.5–14.9)
ETHANOL PERCENT: <0.01 %
ETHANOLAMINE SERPL-MCNC: <10 MG/DL (ref 0–0.08)
GFR, ESTIMATED: >90 ML/MIN/1.73M2
GLUCOSE SERPL-MCNC: 99 MG/DL (ref 70–99)
HCT VFR BLD AUTO: 35.4 % (ref 41–53)
HGB BLD-MCNC: 11.7 G/DL (ref 13.5–17.5)
LYMPHOCYTES NFR BLD: 1.5 K/UL (ref 1–4.8)
LYMPHOCYTES RELATIVE PERCENT: 18 % (ref 24–44)
MCH RBC QN AUTO: 30.5 PG (ref 26–34)
MCHC RBC AUTO-ENTMCNC: 33.1 G/DL (ref 31–37)
MCV RBC AUTO: 92.3 FL (ref 80–100)
MONOCYTES NFR BLD: 0.8 K/UL (ref 0.1–1.3)
MONOCYTES NFR BLD: 10 % (ref 1–7)
NEUTROPHILS NFR BLD: 72 % (ref 36–66)
NEUTS SEG NFR BLD: 6.2 K/UL (ref 1.3–9.1)
PLATELET # BLD AUTO: 280 K/UL (ref 150–450)
PMV BLD AUTO: 7.5 FL (ref 6–12)
POTASSIUM SERPL-SCNC: 3.7 MMOL/L (ref 3.7–5.3)
PROT SERPL-MCNC: 7.3 G/DL (ref 6.4–8.3)
RBC # BLD AUTO: 3.84 M/UL (ref 4.5–5.9)
SODIUM SERPL-SCNC: 140 MMOL/L (ref 135–144)
WBC OTHER # BLD: 8.6 K/UL (ref 3.5–11)

## 2024-08-25 PROCEDURE — 80053 COMPREHEN METABOLIC PANEL: CPT

## 2024-08-25 PROCEDURE — 99223 1ST HOSP IP/OBS HIGH 75: CPT | Performed by: PSYCHIATRY & NEUROLOGY

## 2024-08-25 PROCEDURE — APPSS60 APP SPLIT SHARED TIME 46-60 MINUTES: Performed by: NURSE PRACTITIONER

## 2024-08-25 PROCEDURE — 85025 COMPLETE CBC W/AUTO DIFF WBC: CPT

## 2024-08-25 PROCEDURE — G0480 DRUG TEST DEF 1-7 CLASSES: HCPCS

## 2024-08-25 PROCEDURE — 99285 EMERGENCY DEPT VISIT HI MDM: CPT

## 2024-08-25 PROCEDURE — 99222 1ST HOSP IP/OBS MODERATE 55: CPT | Performed by: INTERNAL MEDICINE

## 2024-08-25 PROCEDURE — 96372 THER/PROPH/DIAG INJ SC/IM: CPT

## 2024-08-25 PROCEDURE — 36415 COLL VENOUS BLD VENIPUNCTURE: CPT

## 2024-08-25 PROCEDURE — 6360000002 HC RX W HCPCS: Performed by: EMERGENCY MEDICINE

## 2024-08-25 PROCEDURE — 2040000000 HC PSYCH ICU R&B

## 2024-08-25 RX ORDER — DIPHENHYDRAMINE HYDROCHLORIDE 50 MG/ML
50 INJECTION INTRAMUSCULAR; INTRAVENOUS EVERY 6 HOURS PRN
Status: DISCONTINUED | OUTPATIENT
Start: 2024-08-25 | End: 2024-08-28 | Stop reason: HOSPADM

## 2024-08-25 RX ORDER — IBUPROFEN 400 MG/1
400 TABLET, FILM COATED ORAL EVERY 6 HOURS PRN
Status: DISCONTINUED | OUTPATIENT
Start: 2024-08-25 | End: 2024-08-28 | Stop reason: HOSPADM

## 2024-08-25 RX ORDER — POLYETHYLENE GLYCOL 3350 17 G/17G
17 POWDER, FOR SOLUTION ORAL DAILY PRN
Status: DISCONTINUED | OUTPATIENT
Start: 2024-08-25 | End: 2024-08-28 | Stop reason: HOSPADM

## 2024-08-25 RX ORDER — POLYETHYLENE GLYCOL 3350 17 G
2 POWDER IN PACKET (EA) ORAL
Status: DISCONTINUED | OUTPATIENT
Start: 2024-08-25 | End: 2024-08-28 | Stop reason: HOSPADM

## 2024-08-25 RX ORDER — LORAZEPAM 1 MG/1
2 TABLET ORAL EVERY 6 HOURS PRN
Status: DISCONTINUED | OUTPATIENT
Start: 2024-08-25 | End: 2024-08-28 | Stop reason: HOSPADM

## 2024-08-25 RX ORDER — MAGNESIUM HYDROXIDE/ALUMINUM HYDROXICE/SIMETHICONE 120; 1200; 1200 MG/30ML; MG/30ML; MG/30ML
30 SUSPENSION ORAL EVERY 6 HOURS PRN
Status: DISCONTINUED | OUTPATIENT
Start: 2024-08-25 | End: 2024-08-28 | Stop reason: HOSPADM

## 2024-08-25 RX ORDER — HYDROXYZINE HYDROCHLORIDE 50 MG/1
50 TABLET, FILM COATED ORAL 3 TIMES DAILY PRN
Status: DISCONTINUED | OUTPATIENT
Start: 2024-08-25 | End: 2024-08-28 | Stop reason: HOSPADM

## 2024-08-25 RX ORDER — TRAZODONE HYDROCHLORIDE 50 MG/1
50 TABLET, FILM COATED ORAL NIGHTLY PRN
Status: DISCONTINUED | OUTPATIENT
Start: 2024-08-25 | End: 2024-08-28 | Stop reason: HOSPADM

## 2024-08-25 RX ORDER — MIDAZOLAM HYDROCHLORIDE 1 MG/ML
2 INJECTION INTRAMUSCULAR; INTRAVENOUS ONCE
Status: COMPLETED | OUTPATIENT
Start: 2024-08-25 | End: 2024-08-25

## 2024-08-25 RX ORDER — ACETAMINOPHEN 325 MG/1
650 TABLET ORAL EVERY 6 HOURS PRN
Status: DISCONTINUED | OUTPATIENT
Start: 2024-08-25 | End: 2024-08-28 | Stop reason: HOSPADM

## 2024-08-25 RX ORDER — HALOPERIDOL 5 MG/ML
5 INJECTION INTRAMUSCULAR ONCE
Status: COMPLETED | OUTPATIENT
Start: 2024-08-25 | End: 2024-08-25

## 2024-08-25 RX ORDER — HALOPERIDOL 5 MG/1
5 TABLET ORAL EVERY 6 HOURS PRN
Status: DISCONTINUED | OUTPATIENT
Start: 2024-08-25 | End: 2024-08-28 | Stop reason: HOSPADM

## 2024-08-25 RX ORDER — LORAZEPAM 2 MG/ML
2 INJECTION INTRAMUSCULAR EVERY 6 HOURS PRN
Status: DISCONTINUED | OUTPATIENT
Start: 2024-08-25 | End: 2024-08-28 | Stop reason: HOSPADM

## 2024-08-25 RX ORDER — PALIPERIDONE 3 MG/1
3 TABLET, EXTENDED RELEASE ORAL DAILY
Status: DISCONTINUED | OUTPATIENT
Start: 2024-08-25 | End: 2024-08-28 | Stop reason: HOSPADM

## 2024-08-25 RX ORDER — HALOPERIDOL 5 MG/ML
5 INJECTION INTRAMUSCULAR EVERY 6 HOURS PRN
Status: DISCONTINUED | OUTPATIENT
Start: 2024-08-25 | End: 2024-08-28 | Stop reason: HOSPADM

## 2024-08-25 RX ADMIN — MIDAZOLAM 2 MG: 1 INJECTION INTRAMUSCULAR; INTRAVENOUS at 09:13

## 2024-08-25 RX ADMIN — HALOPERIDOL LACTATE 5 MG: 5 INJECTION, SOLUTION INTRAMUSCULAR at 09:13

## 2024-08-25 RX ADMIN — DIPHENHYDRAMINE HYDROCHLORIDE 50 MG: 50 INJECTION INTRAMUSCULAR; INTRAVENOUS at 09:12

## 2024-08-25 ASSESSMENT — LIFESTYLE VARIABLES
HOW OFTEN DO YOU HAVE A DRINK CONTAINING ALCOHOL: PATIENT UNABLE TO ANSWER
HOW OFTEN DO YOU HAVE A DRINK CONTAINING ALCOHOL: PATIENT DECLINED
HOW MANY STANDARD DRINKS CONTAINING ALCOHOL DO YOU HAVE ON A TYPICAL DAY: PATIENT UNABLE TO ANSWER
HOW OFTEN DO YOU HAVE A DRINK CONTAINING ALCOHOL: PATIENT UNABLE TO ANSWER
HOW MANY STANDARD DRINKS CONTAINING ALCOHOL DO YOU HAVE ON A TYPICAL DAY: PATIENT DECLINED
HOW MANY STANDARD DRINKS CONTAINING ALCOHOL DO YOU HAVE ON A TYPICAL DAY: PATIENT UNABLE TO ANSWER

## 2024-08-25 ASSESSMENT — SLEEP AND FATIGUE QUESTIONNAIRES
DO YOU USE A SLEEP AID: COMMENT
SLEEP PATTERN: UNABLE TO ASSESS
DO YOU HAVE DIFFICULTY SLEEPING: COMMENT

## 2024-08-25 NOTE — GROUP NOTE
Group Therapy Note    Date: 8/25/2024    Group Start Time: 1300  Group End Time: 1330  Group Topic: Healthy Living/Wellness    Maryanne Seo    Wellness Group Note      Group Topic: Positive Coping Skills     Attendees: 4/6    Patient's Goal: Increased understanding of methods and ways to cope.      Notes: Patient minimally participated in game and discussion regarding coping skills.      Status After Intervention: Same    Participation Level: Active Listener    Participation Quality: Appropriate     Speech: normal      Thought Process/Content: Logical, Linear      Affective Functioning: Incongruent      Mood: anxious, flat, ambivalent     Level of consciousness: Oriented x0     Response to Learning: Able to verbalize current knowledge/experience, Able to                                         verbalize/acknowledge new learning, Able to retain                                         information, and Capable of insight       Endings: None Reported      Modes of Intervention: Education, Support, Socialization, and Problem-solving       Discipline Responsible: Behavorial Health Tech      Signature: Maryanne Gomez

## 2024-08-25 NOTE — H&P
[facility] Internal Medicine  Ankur Espinosa MD; Luis Peacock MD, Jacob Mcintosh MD, Brooklyn Carreno MD, Ken Pearson MD; Monet Grider MD    [facility] Internal Medicine   IN-PATIENT SERVICE   [facility]     HISTORY AND PHYSICAL EXAMINATION            Date:   8/25/2024  Patient name:  Norbert Bravo  Date of admission:  8/25/2024  9:11 AM  MRN:   [id number]  Account:  [id number]  YOB: 1990  PCP:    No primary care provider on file.  Room:   [address] [place] [address]  Code Status:    Full Code      Chief Complaint:     Suicidal /Ac Psychosis    History Obtained From:     Patient/EMR/bedside RN     History of Present Illness:   With past medical history of depression and polysubstance abuse is been admitted at [facility] for further management of depression suicidal ideation, recently discharged from [facility] almost 2 weeks ago with similar symptoms,  His labs reviewed satisfactory  Patient noncooperative, told me he is not Norbert but he is a human, denies any chest pain shortness of breath, poor history of        Patient is 33-year-old male    Past Medical History:   Diagnosis Date    Depression     Polysubstance abuse (HCC)     drug abuse includes heroin, cannabis,    Suicidal thoughts         Past Surgical History:     History reviewed. No pertinent surgical history.     Medications Prior to Admission:     Prior to Admission medications    Medication Sig Start Date End Date Taking? Authorizing Provider   paliperidone (INVEGA) 3 MG extended release tablet Take 1 tablet by mouth daily 8/13/24   Boby Mueller MD        Allergies:     Patient has no known allergies.    Social History:     Tobacco:    reports that he has been smoking cigarettes. He does not have any smokeless tobacco history on file.  Alcohol:      reports current alcohol use of about 4.2 standard drinks of alcohol per week.  Drug Use:  reports current drug use. Drugs: IV, Marijuana (Weed),  and Opiates .    Family History:     History reviewed. No pertinent family history.    Review of Systems:     Positive and Negative as described in HPI.    CONSTITUTIONAL:  negative for fevers, chills, sweats, fatigue, weight loss  HEENT:  negative for vision, hearing changes, runny nose, throat pain  RESPIRATORY:  negative for shortness of breath, cough, congestion, wheezing.  CARDIOVASCULAR:  negative for chest pain, palpitations.  GASTROINTESTINAL:  negative for nausea, vomiting, diarrhea, constipation, change in bowel habits, abdominal pain   GENITOURINARY:  negative for difficulty of urination, burning with urination, frequency   INTEGUMENT:  negative for rash, skin lesions, easy bruising   HEMATOLOGIC/LYMPHATIC:  negative for swelling/edema   ALLERGIC/IMMUNOLOGIC:  negative for urticaria , itching  ENDOCRINE:  negative increase in drinking, increase in urination, hot or cold intolerance  MUSCULOSKELETAL:  negative joint pains, muscle aches, swelling of joints  NEUROLOGICAL:  negative for headaches, dizziness, lightheadedness, numbness, pain, tingling extremities      Physical Exam:     BP (!) 137/93   Pulse 90   Temp 98.2 °F (36.8 °C) (Oral)   Resp 14   Ht 1.727 m (5' 7.99\")   Wt 72.6 kg (160 lb)   SpO2 97%   BMI 24.33 kg/m²   Temp (24hrs), Av.3 °F (36.8 °C), Min:98.2 °F (36.8 °C), Max:98.3 °F (36.8 °C)    No results for input(s): \"POCGLU\" in the last 72 hours.  No intake or output data in the 24 hours ending 24 1527    General Appearance:  alert, well appearing, and in no acute distress  Mental status: Did not answer  Head:  normocephalic, atraumatic.  Neck: supple, no carotid bruits, thyroid not palpable  Lungs: Bilateral equal air entry, clear to ausculation, no wheezing, rales or rhonchi, normal effort  Cardiovascular: normal rate, regular rhythm, no murmur, gallop, rub.  Abdomen: Soft, nontender, nondistended, normal bowel sounds, no hepatomegaly or splenomegaly  Neurologic: CN II-XII

## 2024-08-25 NOTE — H&P
Department of Psychiatry  Attending Physician Psychiatric Assessment     Reason for Admission to Psychiatric Unit:  Acute disordered/bizarre behavior or psychomotor agitation or retardation;interferes with ADLs so that patient cannot function at a less intensive care level of care during evaluation and treatment   A mental disorder causing major disability in social, interpersonal, occupational, and/or educational functioning that is leading to dangerous or life-threatening functioning, and that can only be addressed in an acute inpatient setting   A mental disorder that causes an inability to maintain adequate nurtrition or self-care, and family/community support cannot provide reliable, essential care, so that the patient cannont function at a less intensive level of care during evaluation and treatment   Concerns about patient's safety in the community    CHIEF COMPLAINT:  Acute psychosis    History obtained from: Patient, electronic medical record          HISTORY OF PRESENT ILLNESS:    Norbert Bravo is a 33 y.o. male who has a past medical history of mental illness and polysubstance abuse.  Patient presented to the ED, per LSW documentation, Norbert Bravo is a 33 y.o. male who presents at the ED via [employer] on a pink slip due to a psychotic episode.      Per pink slip,   \"Subject is stating that several people are trying to kill him, that don't exist. Subject seen walking in traffic. Subject acting afraid, then aggressive towards officers.\"      Upon arrival of [facility] patient shouting \"you are all , you all need to get away from me!\" Patient moving back and fourth yelling at staff, balling up fists, but does not hit staff.      Per [employer], patient was found in front of library shouting. Patient stating that there was a  in the bushes who is spying on him. Patient reports that he believes the FBI is after him. Patient presents paranoid.      Patient has history of polysubstance abuse use.      Patient    Legal   Living situation   Educational     Past Medical History:   Diagnosis Date    Depression     Polysubstance abuse (HCC)     drug abuse includes heroin, cannabis,    Suicidal thoughts         PATIENT HANDOFF:  Home medications reviewed.   Medication management per attending  Monitor need and frequency of PRN medications.    CONSULT:  [x] Yes [] No  Internal medicine for medical management/medical H&P      Risk Management: close watch per standard protocol      Psychotherapy: participation in milieu and group and individual sessions with Attending Physician,  and Physician Assistant/CNP      Estimated length of stay:  2-14 days      GENERAL PATIENT/FAMILY EDUCATION  Patient will understand basic signs and symptoms, patient will understand benefits/risks and potential side effects from proposed medications, and patient will understand their role in recovery.  Family is not active in patient's care.   Patient assets that may be helpful during treatment include: Intent to participate and engage in treatment, sufficient fund of knowledge and intellect to understand and utilize treatments.    OUTCOME QUESTIONNAIRE (OQ 30.2):  [] Completed [x] Patient too ill to participate    Goals:    1) Remission of psychosis  2) Stabilization of symptoms prior to discharge.  3) Establish efficacy and tolerability of medications.       Behavioral Services  Medicare Certification     Admission Day 1  I certify that this patient's inpatient psychiatric hospital admission is medically necessary for:    x (1) treatment which could reasonably be expected to improve this patient's condition, or    x (2) diagnostic study or its equivalent.     Time Spent: 60 minutes     Physicians Signature:  Electronically signed by DILCIA Peralta CNP on 8/25/24 at 2:45 PM EDT is a 33 y.o. male being evaluated face to face    --DILCIA Peralta CNP on 8/25/2024 at 2:45 PM    An electronic signature was used to authenticate

## 2024-08-25 NOTE — BH NOTE
Patient given tobacco quitline number 40295824478 at this time, refusing to call at this time, states \" I just dont want to quit now\"- patient given information as to the dangers of long term tobacco use. Continue to reinforce the importance of tobacco cessation.

## 2024-08-25 NOTE — BH NOTE
Patient informed that dinner is on unit and informed of new medication ordered. Patient shook his head from left to right, only statement statement was \"I am human\". Patient then approximately 8 minutes later awoken and mumbled another statement about being human. Writer again informed patient of dinner and offered to show him. Patient appears upset by being called \"Norbert\", states \"That's not my name\". Patient did not respond to questions on what he would be preferred to be called. Patient again informed of medication, and ignores writer. Patient then closes eyes and continues to rest. Respirations even and unlabored.

## 2024-08-25 NOTE — BH NOTE
Sitter at bedside ordered for patient. Patient to be evaluated each shift to see if sitter should be continued and/or discontinued. Staff informed. Charge Nurse informed as well.

## 2024-08-25 NOTE — ED NOTES
Mode of arrival (squad #, walk in, police, etc) : [employer]        Chief complaint(s): mental health evaluation        Arrival Note (brief scenario, treatment PTA, etc).: pt presents to ed via [employer] on pink slip for mental health evaluation. Pt presents paranoid thinking the police is planting things on him and tricking him. Pt appears to be aggressive yelling upon arrival and not wanting to comply. Prn agitation meds given per md orders         C= \"Have you ever felt that you should Cut down on your drinking?\"  No  A= \"Have people Annoyed you by criticizing your drinking?\"  No  G= \"Have you ever felt bad or Guilty about your drinking?\"  No  E= \"Have you ever had a drink as an Eye-opener first thing in the morning to steady your nerves or to help a hangover?\"  No      Deferred []      Reason for deferring: N/A    *If yes to two or more: probable alcohol abuse.*

## 2024-08-25 NOTE — PROGRESS NOTES
RT was unable to complete assessment during this shift. RT will seek out pt to complete assessment during next shift on 8/26/2024.

## 2024-08-25 NOTE — ED PROVIDER NOTES
by police.  Several people called police because the patient was out in the street and next to the library.  He was exhibiting erratic behavior.  Patient is saying very paranoid things to me like the police is all out to get him.  He said that myself and the nurse are also police.  He is not really providing any history pertinent to his presenting illness.    2)  Data Reviewed and Analyzed  (Lab and radiology tests/orders below in next section)    Lab results are unremarkable, vital signs are stable.  Patient is cleared from medical perspective.      3)  Treatment and Disposition           Patient had to be medicated on arrival secondary to his paranoia and psychosis.  He was given Benadryl, Versed, Haldol.  Patient would benefit from inpatient psychiatric evaluation and treatment.  He will be admitted to [facility].    CRITICAL CARE:       PROCEDURES:    Procedures      DATA FOR LAB AND RADIOLOGY TESTS ORDERED BELOW ARE REVIEWED BY THE ED CLINICIAN:    RADIOLOGY: All x-rays, CT, MRI, and formal ultrasound images (except ED bedside ultrasound) are read by the radiologist, see reports below, unless otherwise noted in MDM or here.  Reports below are reviewed by myself.  No orders to display       LABS: Lab orders shown below, the results are reviewed by myself, and all abnormals are listed below.  Labs Reviewed   CBC WITH AUTO DIFFERENTIAL - Abnormal; Notable for the following components:       Result Value    RBC 3.84 (*)     Hemoglobin 11.7 (*)     Hematocrit 35.4 (*)     Neutrophils % 72 (*)     Lymphocytes % 18 (*)     Monocytes % 10 (*)     All other components within normal limits   COMPREHENSIVE METABOLIC PANEL   ETHANOL   URINE DRUG SCREEN       Vitals Reviewed:    Vitals:    08/25/24 0930   BP: 123/86   Pulse: 96   Resp: 16   Temp: 98.3 °F (36.8 °C)   TempSrc: Oral   SpO2: 97%   Weight: 72.6 kg (160 lb)     MEDICATIONS GIVEN TO PATIENT THIS ENCOUNTER:  Orders Placed This Encounter   Medications    diphenhydrAMINE  (BENADRYL) injection 50 mg    haloperidol lactate (HALDOL) injection 5 mg    midazolam (VERSED) injection 2 mg     DISCHARGE PRESCRIPTIONS:  New Prescriptions    No medications on file     PHYSICIAN CONSULTS ORDERED THIS ENCOUNTER:  None  FINAL IMPRESSION      1. Acute psychosis (HCC)    2. Paranoia (HCC)          DISPOSITION/PLAN   DISPOSITION Decision To Admit 08/25/2024 10:17:36 AM  Condition at Disposition: Fair      OUTPATIENT FOLLOW UP THE PATIENT:  No follow-up provider specified.    DO Angela Malcolm, Angela YEE DO  08/25/24 1019

## 2024-08-25 NOTE — CARE COORDINATION
Psychosocial Assessment    Current Level of Psychosocial Functioning     Independent X  Dependent    Minimal Assist     Comments:      Psychosocial High Risk Factors (check all that apply)    Unable to obtain meds   Chronic illness/pain    Substance abuse  X  Lack of Family Support    Financial stress   Isolation   Inadequate Community Resources  Suicide attempt(s)   X  Not taking medications  X  Victim of crime   Developmental Delay  Unable to manage personal needs    Age 65 or older   Homeless  No transportation   Readmission within 30 days  Unemployment  Traumatic Event    Family/Supports identified: Patient did not engage in assessment.      Patient Strengths: Has insurance     Patient Barriers: Substance use, non compliance with medications, and appointments.      CMHC/MH history: History with [facility].    Plan of Care:  medication management, group/individual therapies, family meetings, psycho -education, treatment team meetings to assist with stabilization    Initial Discharge Plan:  Will need to reengage with patient once stable.    Clinical Summary:  Patient is a 33  year old male admitted to [facility] on pink slip dated 8/25/24 at 0912 am for safety. Patient was discharged on 8/12/24. Patient did not respond to social work for assessment. Social work was informed the patient had just feel asleep after being redirected to his room.

## 2024-08-25 NOTE — BH NOTE
[facility]  Admission Note     Admission Type:        Reason for admission:  Reason for Admission: Patient brought in by police after walking into traffic, delusional thinking, believing people want to kill him. Patient paranoid and aggressive toward officers.      Addictive Behavior:   Addictive Behavior  In the Past 3 Months, Have You Felt or Has Someone Told You That You Have a Problem With  : Other (comment) (HASMUKH)    Medical Problems:   Past Medical History:   Diagnosis Date    Depression     Polysubstance abuse (HCC)     drug abuse includes heroin, cannabis,    Suicidal thoughts        Status EXAM:  Mental Status and Behavioral Exam  Normal: No  Level of Assistance: Independent/Self  Facial Expression: Exaggerated  Affect: Unstable  Level of Consciousness: Alert  Frequency of Checks: 4 times per hour, close  Mood:Normal: No  Mood: Anxious, Suspicious  Motor Activity:Normal: Yes  Eye Contact: Fair  Observed Behavior: Cooperative, Preoccupied, Compulsive  Sexual Misconduct History: Current - no  Preception: [place] to person  Attention:Normal: No  Attention: Distractible, Unable to concentrate  Thought Processes: Unremarkable  Thought Content:Normal: No  Thought Content: Preoccupations  Depression Symptoms: Crying, Feelings of hopelessess  Anxiety Symptoms: Generalized  Noemi Symptoms: No problems reported or observed.  Hallucinations: None  Delusions: No  Memory:Normal: Yes  Memory: Poor recent  Insight and Judgment: No  Insight and Judgment: Poor judgment, Poor insight    Tobacco Screening:  Practical Counseling, on admission, nghia X, if applicable and completed (first 3 are required if patient doesn't refuse):            ( ) Recognizing danger situations (included triggers and roadblocks)                    ( ) Coping skills (new ways to manage stress,relaxation techniques, changing routine, distraction)                                                           ( ) Basic information about

## 2024-08-25 NOTE — BH NOTE
Hat removed from toilet due to discontinue of 1:1. Patient to be evaluated to see if 1:1 will be needed when awake. Specimen cup provided.

## 2024-08-25 NOTE — ED NOTES
Provisional Diagnosis:   Acute psychosis     Psychosocial and Contextual Factors:   Patient presents at the ED via [employer] on a pink slip due to a psychotic episode.     C-SSRS Summary:  X    Patient: X  Family:    Agency:      Substance Abuse: Hx    Present Suicidal Behavior:  Patient denies    Verbal:      Attempt:     Past Suicidal Behavior: Patient denies    Verbal:     Attempt:       Self-Injurious/Self-Mutilation:       Violence Current or Past        Trauma Identified:       Protective Factors:           Risk Factors:           Clinical Summary:    Norbert Bravo is a 33 y.o. male who presents at the ED via [employer] on a pink slip due to a psychotic episode.     Per pink slip,   \"Subject is stating that several people are trying to kill him, that don't exist. Subject seen walking in traffic. Subject acting afraid, then aggressive towards officers.\"     Upon arrival of [facility] patient shouting \"you are all , you all need to get away from me!\" Patient moving back and fourth yelling at staff, balling up fists, but does not hit staff.     Per [employer], patient was found in front of library shouting. Patient stating that there was a  in the bushes who is spying on him. Patient reports that he believes the FBI is after him. Patient presents paranoid.     Patient has history of polysubstance abuse use.     Patient denies being linked with any mental health agency. Patient has history of suicidal ideations.     Patient last admitted to [facility] 08/06/2024-08/12/2024.       Level of Care Disposition:    Writer consulted with Dr. Mueller who recommends inpatient psychiatric admission for safety and stabilization. Patient admitted via pink slip.

## 2024-08-25 NOTE — BH NOTE
Patient sleeping in bed with 1:1 at bedside. Hat place in toilet for urine analysis. BHT at side informed and additional staff on unit informed. Patient present with altered mental status and paranoia, RN unable to assess to see if patient understands procedure.

## 2024-08-25 NOTE — BH NOTE
Patient awake for short period of time. Patient appears confused, and ask who writer is. Writer introduces self to patient - patient then stated \"I'm human\". Patient offered to use the restroom and explained urine analysis again. Patient shows no understanding, recovers self back up with blanket. Patient does take a sip of water after encouragement. Currently resting.

## 2024-08-26 LAB
AMPHET UR QL SCN: POSITIVE
BARBITURATES UR QL SCN: NEGATIVE
BENZODIAZ UR QL: POSITIVE
CANNABINOIDS UR QL SCN: POSITIVE
CHOLEST SERPL-MCNC: 164 MG/DL
CHOLESTEROL/HDL RATIO: 3.4
COCAINE UR QL SCN: POSITIVE
EST. AVERAGE GLUCOSE BLD GHB EST-MCNC: 111 MG/DL
FENTANYL UR QL: NEGATIVE
HBA1C MFR BLD: 5.5 % (ref 4–6)
HDLC SERPL-MCNC: 48 MG/DL
LDLC SERPL CALC-MCNC: 108 MG/DL (ref 0–130)
METHADONE UR QL: NEGATIVE
OPIATES UR QL SCN: NEGATIVE
OXYCODONE UR QL SCN: NEGATIVE
PCP UR QL SCN: NEGATIVE
TEST INFORMATION: ABNORMAL
TRIGL SERPL-MCNC: 41 MG/DL

## 2024-08-26 PROCEDURE — 80061 LIPID PANEL: CPT

## 2024-08-26 PROCEDURE — 80307 DRUG TEST PRSMV CHEM ANLYZR: CPT

## 2024-08-26 PROCEDURE — 36415 COLL VENOUS BLD VENIPUNCTURE: CPT

## 2024-08-26 PROCEDURE — 2040000000 HC PSYCH ICU R&B

## 2024-08-26 PROCEDURE — 83036 HEMOGLOBIN GLYCOSYLATED A1C: CPT

## 2024-08-26 PROCEDURE — 99232 SBSQ HOSP IP/OBS MODERATE 35: CPT | Performed by: PSYCHIATRY & NEUROLOGY

## 2024-08-26 PROCEDURE — APPSS30 APP SPLIT SHARED TIME 16-30 MINUTES

## 2024-08-26 PROCEDURE — 6370000000 HC RX 637 (ALT 250 FOR IP): Performed by: PSYCHIATRY & NEUROLOGY

## 2024-08-26 RX ADMIN — PALIPERIDONE 3 MG: 3 TABLET, EXTENDED RELEASE ORAL at 08:00

## 2024-08-26 NOTE — PROGRESS NOTES
Behavioral Services  Medicare Certification Upon Admission    I certify that this patient's inpatient psychiatric hospital admission is medically necessary for:    [x] (1) Treatment which could reasonably be expected to improve this patient's condition,       [x] (2) Or for diagnostic study;     AND     [x](2) The inpatient psychiatric services are provided while the individual is under the care of a physician and are included in the individualized plan of care.    Estimated length of stay/service 2-4 days    Plan for post-hospital care hc    Electronically signed by John Paul Valerio MD on 8/26/2024 at 6:54 PM

## 2024-08-26 NOTE — PROGRESS NOTES

## 2024-08-26 NOTE — PROGRESS NOTES
[facility] Internal Medicine  Ankur Espinosa MD; Luis Peacock MD, Jacob Mcintosh MD, Brooklyn Carreno MD, Ken Pearson MD; Monet Grider MD    [facility] Internal Medicine   IN-PATIENT SERVICE   [facility]     HISTORY AND PHYSICAL EXAMINATION            Date:   8/26/2024  Patient name:  Norbert Bravo  Date of admission:  8/25/2024  9:11 AM  MRN:   [id number]  Account:  [id number]  YOB: 1990  PCP:    No primary care provider on file.  Room:   [address] [place] [address]  Code Status:    Full Code      Chief Complaint:     Suicidal /Ac Psychosis    History Obtained From:     Patient/EMR/bedside RN     History of Present Illness:   With past medical history of depression and polysubstance abuse is been admitted at [facility] for further management of depression suicidal ideation, recently discharged from [facility] almost 2 weeks ago with similar symptoms,  His labs reviewed satisfactory  Patient noncooperative, told me he is not Norbert but he is a human, denies any chest pain shortness of breath, poor history of        Patient is 33-year-old male    Past Medical History:   Diagnosis Date    Depression     Polysubstance abuse (HCC)     drug abuse includes heroin, cannabis,    Suicidal thoughts         Past Surgical History:     History reviewed. No pertinent surgical history.     Medications Prior to Admission:     Prior to Admission medications    Medication Sig Start Date End Date Taking? Authorizing Provider   paliperidone (INVEGA) 3 MG extended release tablet Take 1 tablet by mouth daily 8/13/24   Boby Mueller MD        Allergies:     Patient has no known allergies.    Social History:     Tobacco:    reports that he has been smoking cigarettes. He does not have any smokeless tobacco history on file.  Alcohol:      reports current alcohol use of about 4.2 standard drinks of alcohol per week.  Drug Use:  reports current drug use. Drugs: IV, Marijuana (Weed),

## 2024-08-26 NOTE — PROGRESS NOTES
Pharmacy Medication History Note      List of current medications patient is taking is complete.    Patient was discharged from [facility] on 08/12/2024.  No change to medication list from [facility].    Current Home Medication List at Time of Admission:  Prior to Admission medications    Medication Sig   paliperidone (INVEGA) 3 MG extended release tablet Take 1 tablet by mouth daily       Please let me know if you have any questions about this encounter. Thank you!    Electronically signed by Ave Willis RPH on 8/26/2024 at 8:10 AM

## 2024-08-26 NOTE — PLAN OF CARE
Problem: Noemi  Goal: Will exhibit normal sleep and speech and no impulsivity  Description: INTERVENTIONS:  1. Administer medication as ordered  2. Set limits on impulsive behavior  3. Make attempts to decrease external stimuli as possible  Outcome: Progressing     Problem: Psychosis  Goal: Will report no hallucinations or delusions  Description: INTERVENTIONS:  1. Administer medication as  ordered  2. Assist with reality testing to support increasing orientation  3. Assess if patient's hallucinations or delusions are encouraging self harm or harm to others and intervene as appropriate  Outcome: Progressing     Problem: Behavior  Goal: Pt/Family maintain appropriate behavior and adhere to behavioral management agreement, if implemented  Description: INTERVENTIONS:  1. Assess patient/family's coping skills and  non-compliant behavior (including use of illegal substances)  2. Notify security of behavior or suspected illegal substances which indicate the need for search of the family and/or belongings  3. Encourage verbalization of thoughts and concerns in a socially appropriate manner  4. Utilize positive, consistent limit setting strategies supporting safety of patient, staff and others  5. Encourage participation in the decision making process about the behavioral management agreement  6. If a visitor's behavior poses a threat to safety call refer to organization policy.  7. Initiate consult with , Psychosocial CNS, Spiritual Care as appropriate  Outcome: Progressing     Problem: Involuntary Admit  Goal: Will cooperate with staff recommendations and doctor's orders and will demonstrate appropriate behavior  Description: INTERVENTIONS:  1. Treat underlying conditions and offer medication as ordered  2. Educate regarding involuntary admission procedures and rules  3. Contain excessive/inappropriate behavior per unit and hospital policies  Outcome: Progressing       Patient is seen in his room alert.

## 2024-08-26 NOTE — GROUP NOTE
Group Therapy Note    Date: 8/26/2024    Group Start Time: 0900  Group End Time: 0930  Group Topic: Community Meeting    Saloni Gilmore        Group Therapy Note    Attendees: 6/7       Patient's Goal:  \"I would like to leave\"    Notes:  Goal setting    Status After Intervention:  Unchanged    Participation Level: Minimal    Participation Quality: Resistant      Speech:  hesitant      Thought Process/Content: Delusional      Affective Functioning: Blunted and Flat      Mood: anxious      Level of consciousness:  Preoccupied      Response to Learning: Resistant      Endings: None Reported    Modes of Intervention: Education, Support, and Socialization      Discipline Responsible: Behavorial Health Tech      Signature:  Saloni Yancey

## 2024-08-26 NOTE — PLAN OF CARE
Problem: Noemi  Goal: Will exhibit normal sleep and speech and no impulsivity  Description: INTERVENTIONS:  1. Administer medication as ordered  2. Set limits on impulsive behavior  3. Make attempts to decrease external stimuli as possible  Outcome: Progressing     Problem: Psychosis  Goal: Will report no hallucinations or delusions  Description: INTERVENTIONS:  1. Administer medication as  ordered  2. Assist with reality testing to support increasing orientation  3. Assess if patient's hallucinations or delusions are encouraging self harm or harm to others and intervene as appropriate  Outcome: Progressing     Problem: Behavior  Goal: Pt/Family maintain appropriate behavior and adhere to behavioral management agreement, if implemented  Description: INTERVENTIONS:  1. Assess patient/family's coping skills and  non-compliant behavior (including use of illegal substances)  2. Notify security of behavior or suspected illegal substances which indicate the need for search of the family and/or belongings  3. Encourage verbalization of thoughts and concerns in a socially appropriate manner  4. Utilize positive, consistent limit setting strategies supporting safety of patient, staff and others  5. Encourage participation in the decision making process about the behavioral management agreement  6. If a visitor's behavior poses a threat to safety call refer to organization policy.  7. Initiate consult with , Psychosocial CNS, Spiritual Care as appropriate  Outcome: Progressing     Problem: Involuntary Admit  Goal: Will cooperate with staff recommendations and doctor's orders and will demonstrate appropriate behavior  Description: INTERVENTIONS:  1. Treat underlying conditions and offer medication as ordered  2. Educate regarding involuntary admission procedures and rules  3. Contain excessive/inappropriate behavior per unit and hospital policies  Outcome: Progressing     Patient is cooperative and guarded.

## 2024-08-26 NOTE — BH NOTE
Sitter continued at bedside: patient isolative, labile, disoriented to self and situation; assessment of need for 1:1 each shift, on-coming staff+charge nurse informed.

## 2024-08-26 NOTE — PROGRESS NOTES
Daily Progress Note  8/26/2024    Patient Name: Norbert Bravo    CHIEF COMPLAINT: Acute psychosis         SUBJECTIVE:    Patient is seen today for a follow up assessment.  Norbert was found resting in his room with a one-to-one sitter.  It is noted that he currently has a one-to-one sitter due to him being intrusive towards other patients.  The patient prefers to be called \"human.\"  He will become upset when you call him by his first name Norbert.  Very disorganized and exhibiting significant thought blocking.  It is noted that his urine drug screen was positive for amphetamines, cocaine, marijuana, and benzodiazepines.  Nursing staff that were familiar with this patient in previous admissions states that he is much different from his baseline.  He is compliant with the scheduled medications so far.  Does show some minimal improvement in behaviors and is becoming more redirectable.  We will continue to monitor.  Has not required any emergency meds in the last 24 hours.  He has no insight to his current mental health.  Does appear to be significantly delusional and paranoid and at times is exhibiting significant thought blocking.  Nursing staff states he is eating and sleeping well.  Medication management discharge planning per attending.      Appetite:  [x] Adequate/Unchanged  [] Increased  [] Decreased      Sleep:       [x] Adequate/Unchanged  [] Fair  [] Poor      Group Attendance on Unit:   [] Yes   [] Selectively    [x] No    Compliant with scheduled medications: [x] Yes  [] No    Received emergency medications in past 24 hrs: [] Yes   [x] No    Medication Side Effects: Denies         Mental Status Exam  Level of consciousness: Alert and awake   Appearance: Appropriate attire for setting, resting in bed, with fair  grooming and hygiene   Behavior/Motor: Approachable, engages with interviewer, no psychomotor abnormalities   Attitude toward examiner: Somewhat cooperative, fair eye contact  Speech: slow and  mmol/L Final    CO2 08/25/2024 21  20 - 31 mmol/L Final    Anion Gap 08/25/2024 13  9 - 17 mmol/L Final    Glucose 08/25/2024 99  70 - 99 mg/dL Final    BUN 08/25/2024 18  6 - 20 mg/dL Final    Creatinine 08/25/2024 0.8  0.7 - 1.2 mg/dL Final    Est, Glom Filt Rate 08/25/2024 >90  >60 mL/min/1.73m2 Final    Comment:       These results are not intended for use in patients <18 years of age.        eGFR results are calculated without a race factor using the 2021 CKD-EPI equation.  Careful clinical correlation is recommended, particularly when comparing to results   calculated using previous equations.  The CKD-EPI equation is less accurate in patients with extremes of muscle mass, extra-renal   metabolism of creatine, excessive creatine ingestion, or following therapy that affects   renal tubular secretion.      Calcium 08/25/2024 8.9  8.6 - 10.4 mg/dL Final    Total Protein 08/25/2024 7.3  6.4 - 8.3 g/dL Final    Albumin 08/25/2024 4.2  3.5 - 5.2 g/dL Final    Total Bilirubin 08/25/2024 1.1  0.3 - 1.2 mg/dL Final    Alkaline Phosphatase 08/25/2024 89  40 - 129 U/L Final    ALT 08/25/2024 17  5 - 41 U/L Final    AST 08/25/2024 20  <40 U/L Final    Ethanol Lvl 08/25/2024 <10  <10 mg/dL Final    Ethanol percent 08/25/2024 <0.010  % Final    Amphetamine Screen, Ur 08/26/2024 POSITIVE (A)  NEGATIVE Final    Comment:       (Positive cutoff 1000 ng/mL)                  Barbiturate Screen, Ur 08/26/2024 NEGATIVE  NEGATIVE Final    Comment:       (Positive cutoff 200 ng/mL)                  Benzodiazepine Screen, Urine 08/26/2024 POSITIVE (A)  NEGATIVE Final    Comment:       (Positive cutoff 200 ng/mL)                  Cocaine Metabolite, Urine 08/26/2024 POSITIVE (A)  NEGATIVE Final    Comment:       (Positive cutoff 300 ng/mL)                  Methadone Screen, Urine 08/26/2024 NEGATIVE  NEGATIVE Final    Comment:       (Positive cutoff 300 ng/mL)                  Opiates, Urine 08/26/2024 NEGATIVE  NEGATIVE Final

## 2024-08-27 PROCEDURE — 99232 SBSQ HOSP IP/OBS MODERATE 35: CPT | Performed by: PSYCHIATRY & NEUROLOGY

## 2024-08-27 PROCEDURE — APPSS30 APP SPLIT SHARED TIME 16-30 MINUTES

## 2024-08-27 PROCEDURE — 6370000000 HC RX 637 (ALT 250 FOR IP): Performed by: PSYCHIATRY & NEUROLOGY

## 2024-08-27 PROCEDURE — 2040000000 HC PSYCH ICU R&B

## 2024-08-27 PROCEDURE — 99231 SBSQ HOSP IP/OBS SF/LOW 25: CPT | Performed by: INTERNAL MEDICINE

## 2024-08-27 RX ADMIN — PALIPERIDONE 3 MG: 3 TABLET, EXTENDED RELEASE ORAL at 08:00

## 2024-08-27 NOTE — PROGRESS NOTES
Daily Progress Note  8/27/2024    Patient Name: Norbert Bravo    CHIEF COMPLAINT: Acute psychosis         SUBJECTIVE:    Patient is seen today for a follow up assessment.  Norbert was found resting in his room.  It is noted that the patient's one-to-one sitter was removed due to remaining in behavioral control and no longer being intrusive to other patients.  He is mostly isolative to his room.  Nursing staff states he is sleeping and eating well.  He does appear to be somewhat paranoid but overall improving.  He denies any hallucinations although he does appear to be attending to internal stimuli at times.  In and out of coherency.  It appears the patient at one time believed he was in the [facility] care home.  When asked orientation questions he is alert and oriented to person, place, and time.  Still somewhat confused to situation and has poor insight to his mental health.  He is taking his medication as prescribed and has not required any IM medications in the past 24 hours.  Overall showing some improvement of symptoms.  We will continue to monitor his progress.  Will transfer to stepdown due to improvement in symptoms and behaviors.  Medication management discharge planning for attending.        Appetite:  [x] Adequate/Unchanged  [] Increased  [] Decreased      Sleep:       [x] Adequate/Unchanged  [] Fair  [] Poor      Group Attendance on Unit:   [] Yes   [] Selectively    [x] No    Compliant with scheduled medications: [x] Yes  [] No    Received emergency medications in past 24 hrs: [] Yes   [x] No    Medication Side Effects: Denies         Mental Status Exam  Level of consciousness: Alert and awake   Appearance: Appropriate attire for setting, resting in bed, with fair  grooming and hygiene   Behavior/Motor: Approachable, engages with interviewer, no psychomotor abnormalities   Attitude toward examiner: Cooperative, attentive, good eye contact  Speech: slow and monotone   Mood: \"Okay\"  Affect:              Phencyclidine, Urine 08/26/2024 NEGATIVE  NEGATIVE Final    Comment:       (Positive cutoff 25 ng/mL)                  Cannabinoid Scrn, Ur 08/26/2024 POSITIVE (A)  NEGATIVE Final    Comment:       (Positive cutoff 50 ng/mL)                  Oxycodone Screen, Ur 08/26/2024 NEGATIVE  NEGATIVE Final    Comment:       (Positive cutoff 100 ng/mL)                  Fentanyl, Ur 08/26/2024 NEGATIVE  NEGATIVE Final    Comment:       (Positive cutoff  5 ng/ml)            Test Information 08/26/2024 Assay provides medical screening only.  The absence of expected drug(s) and/or metabolite(s) may indicate diluted or adulterated urine, limitations of testing or timing of collection.   Final    Comment: Testing for legal purposes should be confirmed by another method.  To request confirmation   of test result, please call the lab within 7 days of sample submission.      Hemoglobin A1C 08/26/2024 5.5  4.0 - 6.0 % Final    Estimated Avg Glucose 08/26/2024 111  mg/dL Final    Comment: The ADA and AACC recommend providing the estimated average glucose result to permit better   patient understanding of their HBA1c result.      Cholesterol, Total 08/26/2024 164  <200 mg/dL Final    Comment:    Cholesterol Guidelines:      <200  Desirable   200-240  Borderline      >240  Undesirable         HDL 08/26/2024 48  >40 mg/dL Final    Comment:    HDL Guidelines:    <40     Undesirable   40-59    Borderline    >59     Desirable         LDL Cholesterol 08/26/2024 108  0 - 130 mg/dL Final    Comment:    LDL Guidelines:     <100    Desirable   100-129   Near to/above Desirable   130-159   Borderline      >159   Undesirable     Direct (measured) LDL and calculated LDL are not interchangeable tests.      Chol/HDL Ratio 08/26/2024 3.4  <5 Final            Triglycerides 08/26/2024 41  <150 mg/dL Final    Comment:    Triglyceride Guidelines:     <150   Desirable   150-199  Borderline   200-499  High     >499   Very high   Based on AHA

## 2024-08-27 NOTE — PLAN OF CARE
Problem: Noemi  Goal: Will exhibit normal sleep and speech and no impulsivity  Description: INTERVENTIONS:  1. Administer medication as ordered  2. Set limits on impulsive behavior  3. Make attempts to decrease external stimuli as possible  Outcome: Progressing     Problem: Psychosis  Goal: Will report no hallucinations or delusions  Description: INTERVENTIONS:  1. Administer medication as  ordered  2. Assist with reality testing to support increasing orientation  3. Assess if patient's hallucinations or delusions are encouraging self harm or harm to others and intervene as appropriate  8/27/2024 0847 by Linda Sosa RN  Outcome: Progressing     Problem: Behavior  Goal: Pt/Family maintain appropriate behavior and adhere to behavioral management agreement, if implemented  Description: INTERVENTIONS:  1. Assess patient/family's coping skills and  non-compliant behavior (including use of illegal substances)  2. Notify security of behavior or suspected illegal substances which indicate the need for search of the family and/or belongings  3. Encourage verbalization of thoughts and concerns in a socially appropriate manner  4. Utilize positive, consistent limit setting strategies supporting safety of patient, staff and others  5. Encourage participation in the decision making process about the behavioral management agreement  6. If a visitor's behavior poses a threat to safety call refer to organization policy.  7. Initiate consult with , Psychosocial CNS, Spiritual Care as appropriate  8/27/2024 0847 by Linda Sosa RN  Outcome: Progressing     Problem: Involuntary Admit  Goal: Will cooperate with staff recommendations and doctor's orders and will demonstrate appropriate behavior  Description: INTERVENTIONS:  1. Treat underlying conditions and offer medication as ordered  2. Educate regarding involuntary admission procedures and rules  3. Contain excessive/inappropriate behavior per unit and hospital

## 2024-08-27 NOTE — PLAN OF CARE
Problem: Psychosis  Goal: Will report no hallucinations or delusions  Description: INTERVENTIONS:  1. Administer medication as  ordered  2. Assist with reality testing to support increasing orientation  3. Assess if patient's hallucinations or delusions are encouraging self harm or harm to others and intervene as appropriate  Outcome: Progressing   Patient denies hallucinations or delusions at this time.  Problem: Behavior  Goal: Pt/Family maintain appropriate behavior and adhere to behavioral management agreement, if implemented  Description: INTERVENTIONS:  1. Assess patient/family's coping skills and  non-compliant behavior (including use of illegal substances)  2. Notify security of behavior or suspected illegal substances which indicate the need for search of the family and/or belongings  3. Encourage verbalization of thoughts and concerns in a socially appropriate manner  4. Utilize positive, consistent limit setting strategies supporting safety of patient, staff and others  5. Encourage participation in the decision making process about the behavioral management agreement  6. If a visitor's behavior poses a threat to safety call refer to organization policy.  7. Initiate consult with , Psychosocial CNS, Spiritual Care as appropriate  Outcome: Progressing   Patient denies suicidal ideals at this time. No sign of self harm noted.   Problem: Involuntary Admit  Goal: Will cooperate with staff recommendations and doctor's orders and will demonstrate appropriate behavior  Description: INTERVENTIONS:  1. Treat underlying conditions and offer medication as ordered  2. Educate regarding involuntary admission procedures and rules  3. Contain excessive/inappropriate behavior per unit and hospital policies  Outcome: Progressing

## 2024-08-27 NOTE — PROGRESS NOTES
[facility] Internal Medicine  Ankur Espinosa MD; Luis Peacock MD, Jacob Mcintosh MD, Brooklyn Carreno MD, Ken Pearson MD; Monet Grider MD    [facility] Internal Medicine   IN-PATIENT SERVICE   [facility]     HISTORY AND PHYSICAL EXAMINATION            Date:   8/27/2024  Patient name:  Norbert Bravo  Date of admission:  8/25/2024  9:11 AM  MRN:   [id number]  Account:  [id number]  YOB: 1990  PCP:    No primary care provider on file.  Room:   [address] [place] [address]  Code Status:    Full Code      Chief Complaint:     Suicidal /Ac Psychosis    History Obtained From:     Patient/EMR/bedside RN     History of Present Illness:   With past medical history of depression and polysubstance abuse is been admitted at [facility] for further management of depression suicidal ideation, recently discharged from [facility] almost 2 weeks ago with similar symptoms,  His labs reviewed satisfactory  Patient noncooperative, told me he is not Norbert but he is a human, denies any chest pain shortness of breath, poor history of        Patient is 33-year-old male    Past Medical History:   Diagnosis Date    Depression     Polysubstance abuse (HCC)     drug abuse includes heroin, cannabis,    Suicidal thoughts         Past Surgical History:     History reviewed. No pertinent surgical history.     Medications Prior to Admission:     Prior to Admission medications    Medication Sig Start Date End Date Taking? Authorizing Provider   paliperidone (INVEGA) 3 MG extended release tablet Take 1 tablet by mouth daily 8/13/24   Boby Mueller MD        Allergies:     Patient has no known allergies.    Social History:     Tobacco:    reports that he has been smoking cigarettes. He does not have any smokeless tobacco history on file.  Alcohol:      reports current alcohol use of about 4.2 standard drinks of alcohol per week.  Drug Use:  reports current drug use. Drugs: IV, Marijuana (Weed),

## 2024-08-27 NOTE — GROUP NOTE
Group Therapy Note    Date: 8/27/2024    Group Start Time: 1000  Group End Time: 1030  Group Topic: Psychotherapy    [facility] Pilar Cevallos MSW, LSW        Group Therapy Note    Attendees: 2/6       Patient's Goal:  Expression of feeling    Notes:  Therapeutic conversation provided and discussed. Patient left group early.    Status After Intervention:  Unchanged    Participation Level: Minimal    Participation Quality: Sharing      Speech:  normal      Thought Process/Content: Logical      Affective Functioning: Congruent      Mood: euthymic      Level of consciousness:  Alert and Oriented x4      Response to Learning: Progressing to goal      Endings: None Reported    Modes of Intervention: Support      Discipline Responsible: /Counselor      Signature:  YANETH Mitchell LSW

## 2024-08-28 VITALS
RESPIRATION RATE: 14 BRPM | OXYGEN SATURATION: 99 % | DIASTOLIC BLOOD PRESSURE: 85 MMHG | HEART RATE: 116 BPM | SYSTOLIC BLOOD PRESSURE: 141 MMHG | TEMPERATURE: 97.6 F | WEIGHT: 160 LBS | HEIGHT: 68 IN | BODY MASS INDEX: 24.25 KG/M2

## 2024-08-28 PROCEDURE — 99239 HOSP IP/OBS DSCHRG MGMT >30: CPT | Performed by: PSYCHIATRY & NEUROLOGY

## 2024-08-28 PROCEDURE — 6370000000 HC RX 637 (ALT 250 FOR IP): Performed by: PSYCHIATRY & NEUROLOGY

## 2024-08-28 RX ORDER — PALIPERIDONE 3 MG/1
3 TABLET, EXTENDED RELEASE ORAL DAILY
Qty: 30 TABLET | Refills: 0 | Status: SHIPPED | OUTPATIENT
Start: 2024-08-29

## 2024-08-28 RX ORDER — HYDROXYZINE HYDROCHLORIDE 50 MG/1
50 TABLET, FILM COATED ORAL 3 TIMES DAILY PRN
Qty: 30 TABLET | Refills: 0 | Status: SHIPPED | OUTPATIENT
Start: 2024-08-28 | End: 2024-09-07

## 2024-08-28 RX ORDER — TRAZODONE HYDROCHLORIDE 50 MG/1
50 TABLET, FILM COATED ORAL NIGHTLY PRN
Qty: 30 TABLET | Refills: 0 | Status: SHIPPED | OUTPATIENT
Start: 2024-08-28

## 2024-08-28 RX ADMIN — PALIPERIDONE 3 MG: 3 TABLET, EXTENDED RELEASE ORAL at 07:43

## 2024-08-28 NOTE — GROUP NOTE
Group Therapy Note    Date: 8/28/2024    Group Start Time: 0900  Group End Time: 0930  Group Topic: Community Meeting    Saloni Gilmore        Group Therapy Note    Attendees: 5/7       Patient's Goal:  Going home today. Need to stay away from substances, get a new job and then a new apartment    Notes:  Goal setting    Status After Intervention:  Improved    Participation Level: Interactive    Participation Quality: Appropriate      Speech:  normal      Thought Process/Content: Logical      Affective Functioning: Congruent      Mood: anxious      Level of consciousness:  Alert, Oriented x4, and Attentive      Response to Learning: Able to verbalize current knowledge/experience      Endings: None Reported    Modes of Intervention: Education, Support, Socialization, and Exploration      Discipline Responsible: Behavorial Health Tech      Signature:  Saloni Yancey

## 2024-08-28 NOTE — DISCHARGE INSTR - DIET

## 2024-08-28 NOTE — PLAN OF CARE
Problem: Noemi  Goal: Will exhibit normal sleep and speech and no impulsivity  Description: INTERVENTIONS:  1. Administer medication as ordered  2. Set limits on impulsive behavior  3. Make attempts to decrease external stimuli as possible  Outcome: Progressing  Note: Patient out for needs aloof of peers this shift. Patient cooperative with assessments  Problem: Psychosis  Goal: Will report no hallucinations or delusions  Description: INTERVENTIONS:  1. Administer medication as  ordered  2. Assist with reality testing to support increasing orientation  3. Assess if patient's hallucinations or delusions are encouraging self harm or harm to others and intervene as appropriate  Outcome: Progressing  Note: Patient denies hallucinations this shift. Patient educated and agrees to come to staff if needed this shift. Patient monitored every 15 minutes with environmental safety checks.      Problem: Behavior  Goal: Pt/Family maintain appropriate behavior and adhere to behavioral management agreement, if implemented  Description: INTERVENTIONS:  1. Assess patient/family's coping skills and  non-compliant behavior (including use of illegal substances)  2. Notify security of behavior or suspected illegal substances which indicate the need for search of the family and/or belongings  3. Encourage verbalization of thoughts and concerns in a socially appropriate manner  4. Utilize positive, consistent limit setting strategies supporting safety of patient, staff and others  5. Encourage participation in the decision making process about the behavioral management agreement  6. If a visitor's behavior poses a threat to safety call refer to organization policy.  7. Initiate consult with , Psychosocial CNS, Spiritual Care as appropriate  Outcome: Progressing  Note: Patient out for needs only this shift. Patient is cooperative with assessments.      Problem: Involuntary Admit  Goal: Will cooperate with staff  recommendations and doctor's orders and will demonstrate appropriate behavior  Description: INTERVENTIONS:  1. Treat underlying conditions and offer medication as ordered  2. Educate regarding involuntary admission procedures and rules  3. Contain excessive/inappropriate behavior per unit and hospital policies  Outcome: Progressing    this shift. Patient educated and agrees to come to staff if needed this shift. Patient monitored every 16 minutes with environmental safety checks.

## 2024-08-28 NOTE — DISCHARGE INSTRUCTIONS
Information:  Medications:   Medication summary provided   I understand that I should take only the medications on my list.     -why and when I need to take each medicine.     -which side effects to watch for.     -that I should carry my medication information at all times in case of     Emergency situations.    I will take all of my medicines to follow up appointments.     -check with my physician or pharmacist before taking any new    Medication, over the counter product or drink alcohol.    -Ask about food, drug or dietary supplement interactions.    -discard old lists and update records with medication providers.    Notify Physician:  Notify physician if you notice:   Always call 911 if you feel your life is in danger  In case of an emergency call 911 immediately!  If 911 is not available call your local emergency medical system for help    Behavioral Health Follow Up:  Original Referral Source:[facility]  Discharge Diagnosis: Paranoia (HCC) [F22]  Acute psychosis (HCC) [F23]  Recommendations for Level of Care: Follow up with community behavioral health center  Patient status at discharge: Patient stable denies thoughts of harming himself or others  My hospital  was: [facility]  Aftercare plan faxed: Jack[facility]   -faxed by: Staff   -date: 8/28/24   -time: 1400  Prescriptions: [facility]    Smoking: Quit Smoking.   Call the [employer]'s smoking quitline at 0-068-21B-QUIT  Know the signs of a heart attack   If you have any of the following symptoms call 911 immediately, do not wait more    Than five minutes.    1. Pressure, fullness and/ or squeezing in the center of the chest spreading to    The jaw, neck or shoulder.    2. Chest discomfort with light headedness, fainting, sweating, nausea or    Shortness of breath.   3. Upper abdominal pressure or discomfort.   4. Lower chest pain, back pain, unusual fatigue, shortness of breath, nausea   Or dizziness.     General Information:   Questions regarding your bill: Call  HELP program [phone]     Suicide Hotline ([facility] Crisis Care Line)  (974) 748-6994      Recovery Help line- 432.565.6200      To obtain results of pending studies call Medical Records at: [phone]     For emergencies and 24 hour/7 days a week contact information:  [phone]

## 2024-08-28 NOTE — CARE COORDINATION
Social work spoke to patient regarding disposition and follow up at the time of discharge. Patient plans to return home with his roommate and follow up with the [facility].

## 2024-08-28 NOTE — GROUP NOTE
Group Therapy Note    Date: 8/28/2024    Group Start Time: 1000  Group End Time: 1030  Group Topic: Psychotherapy    [facility], [place]        Group Therapy Note    Attendees: 1/7     Patient was offered group therapy today but declined to participate despite encouragement from staff.  1:1 was offered.         Signature:  YANETH Mitchell, [place]

## 2024-08-28 NOTE — DISCHARGE SUMMARY
Provider Discharge Summary     Patient ID:  Norbert Bravo  [id number]  33 y.o.  1990    Admit date: 8/25/2024    Discharge date and time: 8/28/2024  7:41 PM     Admitting Physician: Boby Mueller MD     Discharge Physician: John Paul Valerio MD    Admission Diagnoses: Paranoia (HCC) [F22]  Acute psychosis (HCC) [F23]    Discharge Diagnoses:      Acute psychosis (HCC)     Patient Active Problem List   Diagnosis Code    Depressive disorder, not elsewhere classified F32.89    Depression with suicidal ideation F32.A, R45.851    Major depressive disorder, recurrent, severe with psychotic features (HCC) F33.3    Acute psychosis (HCC) F23        Admission Condition: poor    Discharged Condition: stable    Indication for Admission: threat to self    History of Present Illnes (present tense wording is of findings from admission exam and are not necessarily indicative of current findings):   Norbert Bravo is a 33 y.o. male who has a past medical history of mental illness and polysubstance abuse.  Patient presented to the ED, per [facility] documentation, Norbert Bravo is a 33 y.o. male who presents at the ED via [employer] on a pink slip due to a psychotic episode.      Per pink slip,   \"Subject is stating that several people are trying to kill him, that don't exist. Subject seen walking in traffic. Subject acting afraid, then aggressive towards officers.\"      Upon arrival of [facility] patient shouting \"you are all , you all need to get away from me!\" Patient moving back and fourth yelling at staff, balling up fists, but does not hit staff.      Per [employer], patient was found in front of library shouting. Patient stating that there was a  in the bushes who is spying on him. Patient reports that he believes the FBI is after him. Patient presents paranoid.      Patient has history of polysubstance abuse use.      Patient denies being linked with any mental health agency. Patient has history of suicidal ideations.       Patient last admitted to [facility] 08/06/2024-08/12/2024     Patient is found seated at a table in the day room, he is guarded and appears suspicious of writer.  He initially refuses to talk to me, repeatedly turns head eft to right, seeming to indicate no.  After several attempts he starts telling the writer \"you are not human, I'm human\", he continues to repeat this, becomes increasingly agitated and encounter is terminated due to lack of patient cooperation.  Information gathered from EMR.  He was discharged from [facility] on 8/12/24, was linked with [facility], prescribed Invega 3 mg PO once daily.  History noncompliance with treatment.  Was brought to ED by police, per records several people called the police because the patient was out on the street exhibiting erratic behavior, paranoid that the police are out to get him, per ED physician documentation, \"He said that myself and the nurse are also police\".  Received emergency medication for agitation while in ED.     History of polysubstance abuse including fentanyl, methamphetamine, crack cocaine, and cannabis.  EtOH level less than 10 in ED.  UDS ordered however patient has yet to provide urine sample.  Previous UDS resulted on 8/6/2024 positive for amphetamine and cannabinoid.     Patient presents with acute psychosis, requires inpatient hospitalization for safety and stabilization.  Hospital Course:   Upon admission, Norbert Bravo was provided a safe secure environment, introduced to unit milieu. Patient participated in groups and individual therapies. Meds were adjusted as noted below. After few days of hospital care, patient began to feel improvement. Depression lifted, thoughts to harm self ceased.  Sleep improved, appetite was good. On morning rounds 8/28/2024, Norbert Bravo endorses feeling ready for discharge. Patient denies suicidal or homicidal ideations, denies hallucinations or delusions. Denies SE's from meds.  It was decided that maximum benefit

## 2024-08-28 NOTE — CARE COORDINATION
Name: Norbert Bravo    : 1990    Auth number: [id number]     Discharge Date: 24    Destination: Home    Discharge Medications:      Medication List        START taking these medications      hydrOXYzine HCl 50 MG tablet  Commonly known as: ATARAX  Take 1 tablet by mouth 3 times daily as needed for Anxiety  Notes to patient: As needed for anxiety     traZODone 50 MG tablet  Commonly known as: DESYREL  Take 1 tablet by mouth nightly as needed for Sleep  Notes to patient: As needed for sleep            CONTINUE taking these medications      paliperidone 3 MG extended release tablet  Commonly known as: INVEGA  Take 1 tablet by mouth daily  Start taking on: 2024  Notes to patient: Mood stabilizer               Where to Get Your Medications        These medications were sent to [id number] - [address] -  [phone] - F [phone]  [address] [place] [id number]      Phone: [phone]   hydrOXYzine HCl 50 MG tablet  paliperidone 3 MG extended release tablet  traZODone 50 MG tablet         Follow Up Appointment: [facility]  [facility] [address]  [place] [address]  [phone]  Go on 2024  At 10:15AM- Mental health medication management appointment

## 2024-08-28 NOTE — BH NOTE
[facility]  Day 3 Interdisciplinary Treatment Plan NOTE    Review Date & Time: 08/28/2024 0900    Admission Type:        Reason for admission:  Reason for Admission: Patient brought in by police after walking into traffic, delusional thinking, believing people want to kill him. Patient paranoid and aggressive toward officers.  Estimated Length of Stay:  5-7 days  Estimated Discharge Date Update:   to be determined by physician    PATIENT STRENGTHS:  Patient Strengths:   Patient Strengths and Limitations:Limitations: Unrealistic self-view, Difficult relationships / poor social skills, Multiple barriers to leisure interests, Difficulty problem solving/relies on others to help solve problems, Inappropriate/potentially harmful leisure interests  Addictive Behavior:Addictive Behavior  In the Past 3 Months, Have You Felt or Has Someone Told You That You Have a Problem With  : Other (comment) (HASMUKH)  Medical Problems:  Past Medical History:   Diagnosis Date    Depression     Polysubstance abuse (HCC)     drug abuse includes heroin, cannabis,    Suicidal thoughts        Risk:  Fall Risk   Tk Scale Tk Scale Score: 22  BVC    Change in scores:  No Changes to plan of Care:  No    Status EXAM:   Mental Status and Behavioral Exam  Normal: No  Level of Assistance: Independent/Self  Facial Expression: Flat  Affect: Appropriate  Level of Consciousness: Alert  Frequency of Checks: 4 times per hour, close  Mood:Normal: No  Mood: Anxious  Motor Activity:Normal: No  Motor Activity: Decreased  Eye Contact: Fair  Observed Behavior: Preoccupied, Cooperative, Withdrawn  Sexual Misconduct History: Current - no  Preception: [place] to person, [place] to time  Attention:Normal: No  Attention: Distractible  Thought Processes: Blocking  Thought Content:Normal: No  Thought Content: Preoccupations, Paranoia  Depression Symptoms: Impaired concentration, Isolative  Anxiety Symptoms: Generalized  Noemi Symptoms: Flight of  discharge goals, continue with medication compliance    SHORT-TERM GOALS UPDATE:   Time frame for Short-Term Goals:  5-7 days    LONG-TERM GOALS UPDATE:   Time frame for Long-Term Goals:  6 months    Members Present in Team Meeting: See signature sheet    Linda Sosa RN

## 2024-08-28 NOTE — BH NOTE
Patient given tobacco quitline number 11455264234 at this time, refusing to call at this time, states \" I just dont want to quit now\"- patient given information as to the dangers of long term tobacco use. Continue to reinforce the importance of tobacco cessation.

## 2024-08-28 NOTE — BH NOTE
[facility]  Discharge Note    Pt discharged with followings belongings:   Dental Appliances: None  Vision - Corrective Lenses: None  Hearing Aid: None  Jewelry: None  Clothing: Shirt, Pants, Socks   Valuables sent home with patient. Patient educated on aftercare instructions: yes  Information faxed to [facility] by Staff  at 12:43 PM .Patient verbalize understanding of AVS:  Yes, patient left with after visit summary and belongings and is going home.    Status EXAM upon discharge:  Mental Status and Behavioral Exam  Normal: No  Level of Assistance: Independent/Self  Facial Expression: Flat  Affect: Appropriate  Level of Consciousness: Alert  Frequency of Checks: 4 times per hour, close  Mood:Normal: No  Mood: Anxious  Motor Activity:Normal: No  Motor Activity: Decreased  Eye Contact: Fair  Observed Behavior: Preoccupied, Cooperative, Withdrawn  Sexual Misconduct History: Current - no  Preception: [place] to person, [place] to time  Attention:Normal: No  Attention: Distractible  Thought Processes: Blocking  Thought Content:Normal: No  Thought Content: Preoccupations, Paranoia  Depression Symptoms: Impaired concentration, Isolative  Anxiety Symptoms: Generalized  Noemi Symptoms: Flight of ideas  Hallucinations: None  Delusions: Yes  Delusions: Paranoid  Memory:Normal: No  Memory: Poor recent, Poor remote  Insight and Judgment: No  Insight and Judgment: Poor judgment, Poor insight    Tobacco Screening:  Practical Counseling, on admission, nghia X, if applicable and completed (first 3 are required if patient doesn't refuse):            ( ) Recognizing danger situations (included triggers and roadblocks)                    ( ) Coping skills (new ways to manage stress,relaxation techniques, changing routine, distraction)                                                           ( ) Basic information about quitting (benefits of quitting, techniques in how to quit, available resources  ( ) Referral for counseling

## 2024-08-28 NOTE — PROGRESS NOTES
CLINICAL PHARMACY NOTE: MEDS TO BEDS    Total # of Prescriptions Filled: 3   The following medications were delivered to the patient:  Trazodone HCL 50MG Tablets  Paliperidone ER 3MG Tablets  Hydroxyzine HCL 50MG Tablets   Additional Documentation:  Delivered to [facility] [address] and signed for by Rachel at 11:47PM 8/28/24

## 2024-08-28 NOTE — TRANSITION OF CARE
[facility] Transition Record    Patient Name: Norbert Bravo  YOB: 1990   Medical Record Number: [id number]  Date of Admission: 8/25/2024  9:11 AM   Date of Discharge: 8/28/24    Attending Provider: John Paul Valerio,*   Discharging Provider: Dr. Valerio  To contact this individual call [phone] and ask the  to page.  If unavailable, ask to be transferred to Behavioral Health Provider on call.  A Behavioral Health Provider will be available on call 24/7 and during holidays.    Primary Care Provider: No primary care provider on file.    No Known Allergies    Reason for Admission: Patient brought in by police after walking into traffic, delusional thinking, believing people want to kill him. Patient paranoid and aggressive toward officers.     Admission Diagnosis: Paranoia (HCC) [F22]  Acute psychosis (HCC) [F23]    * No surgery found *    Results for orders placed or performed during the hospital encounter of 08/25/24   CBC with Auto Differential   Result Value Ref Range    WBC 8.6 3.5 - 11.0 k/uL    RBC 3.84 (L) 4.5 - 5.9 m/uL    Hemoglobin 11.7 (L) 13.5 - 17.5 g/dL    Hematocrit 35.4 (L) 41 - 53 %    MCV 92.3 80 - 100 fL    MCH 30.5 26 - 34 pg    MCHC 33.1 31 - 37 g/dL    RDW 14.8 11.5 - 14.9 %    Platelets 280 150 - 450 k/uL    MPV 7.5 6.0 - 12.0 fL    Neutrophils % 72 (H) 36 - 66 %    Lymphocytes % 18 (L) 24 - 44 %    Monocytes % 10 (H) 1 - 7 %    Eosinophils % 0 0 - 4 %    Basophils % 0 0 - 2 %    Neutrophils Absolute 6.20 1.3 - 9.1 k/uL    Lymphocytes Absolute 1.50 1.0 - 4.8 k/uL    Monocytes Absolute 0.80 0.1 - 1.3 k/uL    Eosinophils Absolute 0.00 0.0 - 0.4 k/uL    Basophils Absolute 0.00 0.0 - 0.2 k/uL   Comprehensive Metabolic Panel   Result Value Ref Range    Sodium 140 135 - 144 mmol/L    Potassium 3.7 3.7 - 5.3 mmol/L    Chloride 106 98 - 107 mmol/L    CO2 21 20 - 31 mmol/L    Anion Gap 13 9 - 17 mmol/L    Glucose 99 70 - 99 mg/dL    BUN 18 6 - 20 mg/dL

## 2025-02-12 NOTE — ED TRIAGE NOTES
Pt comes to ED with c/o SI. Pt states having depression hx, SI hx, started feeling suicidal last night[place], is hopeless, no HI, auditorial hallucinations to kill himself, has taken drugs and alcohol recently. Pt a/o x4, resting on stretcher, RR even and non labored, suicide precautions started, belongings locked by [employer], changed into paper gown, [employer] as sitter.    Toprol XL 50  eliquis